# Patient Record
Sex: MALE | Race: WHITE | NOT HISPANIC OR LATINO
[De-identification: names, ages, dates, MRNs, and addresses within clinical notes are randomized per-mention and may not be internally consistent; named-entity substitution may affect disease eponyms.]

---

## 2018-01-29 ENCOUNTER — TRANSCRIPTION ENCOUNTER (OUTPATIENT)
Age: 53
End: 2018-01-29

## 2020-07-27 ENCOUNTER — OUTPATIENT (OUTPATIENT)
Dept: OUTPATIENT SERVICES | Facility: HOSPITAL | Age: 55
LOS: 1 days | End: 2020-07-27
Payer: COMMERCIAL

## 2020-07-27 ENCOUNTER — APPOINTMENT (OUTPATIENT)
Dept: ULTRASOUND IMAGING | Facility: HOSPITAL | Age: 55
End: 2020-07-27
Payer: COMMERCIAL

## 2020-07-27 DIAGNOSIS — Z00.8 ENCOUNTER FOR OTHER GENERAL EXAMINATION: ICD-10-CM

## 2020-07-27 PROCEDURE — 76770 US EXAM ABDO BACK WALL COMP: CPT | Mod: 26

## 2020-07-27 PROCEDURE — 76770 US EXAM ABDO BACK WALL COMP: CPT

## 2020-12-03 ENCOUNTER — APPOINTMENT (OUTPATIENT)
Dept: UROLOGY | Facility: CLINIC | Age: 55
End: 2020-12-03
Payer: COMMERCIAL

## 2020-12-03 VITALS
WEIGHT: 162 LBS | BODY MASS INDEX: 20.79 KG/M2 | DIASTOLIC BLOOD PRESSURE: 71 MMHG | HEART RATE: 84 BPM | RESPIRATION RATE: 17 BRPM | SYSTOLIC BLOOD PRESSURE: 135 MMHG | HEIGHT: 74 IN | TEMPERATURE: 97.8 F

## 2020-12-03 DIAGNOSIS — Z78.9 OTHER SPECIFIED HEALTH STATUS: ICD-10-CM

## 2020-12-03 DIAGNOSIS — Z80.1 FAMILY HISTORY OF MALIGNANT NEOPLASM OF TRACHEA, BRONCHUS AND LUNG: ICD-10-CM

## 2020-12-03 DIAGNOSIS — F17.200 NICOTINE DEPENDENCE, UNSPECIFIED, UNCOMPLICATED: ICD-10-CM

## 2020-12-03 DIAGNOSIS — Z80.8 FAMILY HISTORY OF MALIGNANT NEOPLASM OF OTHER ORGANS OR SYSTEMS: ICD-10-CM

## 2020-12-03 DIAGNOSIS — Z80.3 FAMILY HISTORY OF MALIGNANT NEOPLASM OF BREAST: ICD-10-CM

## 2020-12-03 PROCEDURE — 99205 OFFICE O/P NEW HI 60 MIN: CPT

## 2020-12-03 PROCEDURE — 99072 ADDL SUPL MATRL&STAF TM PHE: CPT

## 2020-12-03 RX ORDER — DIAZEPAM 2 MG/1
2 TABLET ORAL 3 TIMES DAILY
Qty: 90 | Refills: 0 | Status: COMPLETED | COMMUNITY
Start: 2020-12-03 | End: 2021-01-04

## 2020-12-03 NOTE — REVIEW OF SYSTEMS
[Negative] : Heme/Lymph [Eyesight Problems] : eyesight problems [see HPI] : see HPI [Loss of interest] : loss of interest in sexual activity [Genital bacterial infection] : genital bacterial infection [Poor quality erections] : Poor quality erections [Told you have blood in urine on a urine test] : told blood was present in a urine test [Discharge from urine canal] : discharge from urine canal [Strong urge to urinate] : strong urge to urinate [Bladder pressure] : experiences bladder pressure [Strain or push to urinate] : strain or push to urinate [Slow urine stream] : slow urine stream [Bladder fullness after urinating] : bladder fullness after urinating [Leakage of urine with urgency] : leakage of urine with urgency [Unaware of when urine is leaking] : unaware of when urine is leaking

## 2020-12-03 NOTE — REASON FOR VISIT
[Initial Visit ___] : [unfilled] is here today for an initial visit  for [unfilled] [Consideration of Curative Therapy] : consideration of curative therapy for prostate cancer [Spouse] : spouse

## 2020-12-04 PROBLEM — Z80.3 FAMILY HISTORY OF MALIGNANT NEOPLASM OF BREAST: Status: ACTIVE | Noted: 2020-12-03

## 2020-12-04 PROBLEM — Z80.1 FAMILY HISTORY OF LUNG CANCER: Status: ACTIVE | Noted: 2020-12-03

## 2020-12-04 PROBLEM — F17.200 CURRENT SMOKER: Status: ACTIVE | Noted: 2020-12-03

## 2020-12-04 PROBLEM — Z80.8 FAMILY HISTORY OF MALIGNANT NEOPLASM OF BRAIN: Status: ACTIVE | Noted: 2020-12-03

## 2020-12-04 PROBLEM — Z78.9 SOCIAL ALCOHOL USE: Status: ACTIVE | Noted: 2020-12-03

## 2020-12-04 NOTE — DISEASE MANAGEMENT
[1] : T1 [c] : c [0] : N0 [X] : MX [0-10] : 0 -10 ng/mL [6] : [place] Score 6 [3] : 3 [I] : I [] : Patient had no Bone Scan performed [BiopsyDate] : 10/13/2020 [TotalCores] : 12 [TotalPositiveCores] : 1 [MaxCoreInvolvement] : 10% [FreeTextEntry7] : PSA prior to biopsy: 5.44 ng/mL.\par MRI prostate 37 mL volume with intravesical protrusion of median lobe, PIRAD 3 left sided lesion.

## 2020-12-04 NOTE — LETTER CLOSING
[Consult Closing:] : Thank you for allowing me to participate in the care of this patient.  If you have any questions, please do not hesitate to contact me. [Sincerely yours,] : Sincerely yours, [FreeTextEntry3] : Trace Delgadillo MD\par System Chief of Urology, [facility] [place]\par  of Urology\par [facility] [employer] [facility]\par [facility] \par [address] [place] [address], [address] \[place] [address]

## 2020-12-04 NOTE — PHYSICAL EXAM
[FreeTextEntry1] : Constitutional:  Well developed, normal appearance, no acute distress\par Cardiovascular:  Heart rate and rhythm were normal, no peripheral edema\par Pulmonary:  No respiratory distress, normal respiratory rhythm and effort, no accessory muscle use\par Abdomen: Soft, non tender, non distended, no costovertebral angle tenderness\par Genitourinary: Meatus normal, the bladder was normal to palpation, normal scrotum, normal testes without masses.\par Rectal: Normal tone, no nodules\par Musculoskeletal:  Gait and station were normal, no palpable tenderness over the spine or axial skeleton\par Skin:  Normal supple, no rashes\par Psychiatric: Oriented to person, place and time, not anxious\par Neurological:  no focal sensory or motor defects\par Lymphatics: no palpable adenopathy, no inguinal or femoral adenopathy\par

## 2020-12-04 NOTE — HISTORY OF PRESENT ILLNESS
[FreeTextEntry1] : Long history of linear track symptoms.  Has been treated with multiple medications: Tamsulosin, finasteride.  In the last 2 months, developed recurrent episodes of urinary retention.  Currently with Villanueva catheter gravity drainage.  Failed multiple cath removal and void trials.  Underwent MRI prostate which revealed 37 mL prostate with intravesical median lobe protrusion.  PI-RADS 3 lesion noted, underwent MRI/US fusion biopsy.  Pathology revealed single core [place] 6 adenocarcinoma the prostate.  Targeted biopsy was negative.\par \par At this point patient is very frustrated with his persistent urinary retention.  He has been started on mirabegron 25 mg for his bladder spasms.  He continues have significant lower abdominal and pelvic pain.\par \par Denies family history of prostate cancer.  Currently has normal erectile function.\par No other chronic medical problems.

## 2020-12-04 NOTE — HISTORY OF PRESENT ILLNESS
[FreeTextEntry1] : Long history of linear track symptoms.  Has been treated with multiple medications: Tamsulosin, finasteride.  In the last 2 months, developed recurrent episodes of urinary retention.  Currently with Villanueva catheter gravity drainage.  Failed multiple cath removal and void trials.  Underwent MRI prostate which revealed 37 mL prostate with intravesical median lobe protrusion.  PI-RADS 3 lesion noted, underwent MRI/US fusion biopsy.  Pathology revealed single core [place] 6 adenocarcinoma the prostate.  Targeted biopsy was negative.\par \par At this point patient is very frustrated with his persistent urinary retention.  He has been started on mirabegron 25 mg for his bladder spasms.  He continues have significant lower abdominal and pelvic pain.\par \par Denies family history of prostate cancer.  Currently has normal erectile function.\par No other chronic medical problems.

## 2020-12-04 NOTE — DISEASE MANAGEMENT
[1] : T1 [c] : c [0] : N0 [X] : MX [0-10] : 0 -10 ng/mL [6] : [place] Score 6 [3] : 3 [I] : I [] : Patient had no Bone Scan performed [BiopsyDate] : 10/13/2020 [TotalCores] : 12 [TotalPositiveCores] : 1 [MaxCoreInvolvement] : 10% [FreeTextEntry7] : PSA prior to biopsy: 5.44 ng/mL.\par MRI prostate 37 mL volume with intravesical protrusion of median lobe, PIRAD 3 left sided lesion.

## 2020-12-04 NOTE — LETTER CLOSING
[Consult Closing:] : Thank you for allowing me to participate in the care of this patient.  If you have any questions, please do not hesitate to contact me. [Sincerely yours,] : Sincerely yours, [FreeTextEntry3] : Trace Delgadillo MD\par System Chief of Urology, [facility] [place]\par  of Urology\par [facility] [employer] [facility]\par [facility] \par [address] [place] [address], [address] \[place] [address]

## 2020-12-24 ENCOUNTER — OUTPATIENT (OUTPATIENT)
Dept: OUTPATIENT SERVICES | Facility: HOSPITAL | Age: 55
LOS: 1 days | End: 2020-12-24
Payer: COMMERCIAL

## 2020-12-24 ENCOUNTER — RESULT REVIEW (OUTPATIENT)
Age: 55
End: 2020-12-24

## 2020-12-24 VITALS
TEMPERATURE: 97 F | SYSTOLIC BLOOD PRESSURE: 120 MMHG | WEIGHT: 160.06 LBS | RESPIRATION RATE: 14 BRPM | OXYGEN SATURATION: 98 % | HEIGHT: 73 IN | DIASTOLIC BLOOD PRESSURE: 80 MMHG | HEART RATE: 64 BPM

## 2020-12-24 DIAGNOSIS — R00.1 BRADYCARDIA, UNSPECIFIED: ICD-10-CM

## 2020-12-24 DIAGNOSIS — C61 MALIGNANT NEOPLASM OF PROSTATE: ICD-10-CM

## 2020-12-24 DIAGNOSIS — Z98.890 OTHER SPECIFIED POSTPROCEDURAL STATES: Chronic | ICD-10-CM

## 2020-12-24 DIAGNOSIS — K08.9 DISORDER OF TEETH AND SUPPORTING STRUCTURES, UNSPECIFIED: ICD-10-CM

## 2020-12-24 LAB
ALBUMIN SERPL ELPH-MCNC: 3.8 G/DL — SIGNIFICANT CHANGE UP (ref 3.3–5)
ALP SERPL-CCNC: 54 U/L — SIGNIFICANT CHANGE UP (ref 40–120)
ALT FLD-CCNC: 10 U/L — SIGNIFICANT CHANGE UP (ref 4–41)
ANION GAP SERPL CALC-SCNC: 9 MMOL/L — SIGNIFICANT CHANGE UP (ref 7–14)
AST SERPL-CCNC: 16 U/L — SIGNIFICANT CHANGE UP (ref 4–40)
BILIRUB SERPL-MCNC: 0.2 MG/DL — SIGNIFICANT CHANGE UP (ref 0.2–1.2)
BLD GP AB SCN SERPL QL: NEGATIVE — SIGNIFICANT CHANGE UP
BUN SERPL-MCNC: 17 MG/DL — SIGNIFICANT CHANGE UP (ref 7–23)
CALCIUM SERPL-MCNC: 9.4 MG/DL — SIGNIFICANT CHANGE UP (ref 8.4–10.5)
CHLORIDE SERPL-SCNC: 101 MMOL/L — SIGNIFICANT CHANGE UP (ref 98–107)
CO2 SERPL-SCNC: 29 MMOL/L — SIGNIFICANT CHANGE UP (ref 22–31)
CREAT SERPL-MCNC: 0.74 MG/DL — SIGNIFICANT CHANGE UP (ref 0.5–1.3)
GLUCOSE SERPL-MCNC: 68 MG/DL — LOW (ref 70–99)
HCT VFR BLD CALC: 42.6 % — SIGNIFICANT CHANGE UP (ref 39–50)
HGB BLD-MCNC: 13.6 G/DL — SIGNIFICANT CHANGE UP (ref 13–17)
MCHC RBC-ENTMCNC: 30.6 PG — SIGNIFICANT CHANGE UP (ref 27–34)
MCHC RBC-ENTMCNC: 31.9 GM/DL — LOW (ref 32–36)
MCV RBC AUTO: 95.9 FL — SIGNIFICANT CHANGE UP (ref 80–100)
NRBC # BLD: 0 /100 WBCS — SIGNIFICANT CHANGE UP
NRBC # FLD: 0 K/UL — SIGNIFICANT CHANGE UP
PLATELET # BLD AUTO: 273 K/UL — SIGNIFICANT CHANGE UP (ref 150–400)
POTASSIUM SERPL-MCNC: 4.1 MMOL/L — SIGNIFICANT CHANGE UP (ref 3.5–5.3)
POTASSIUM SERPL-SCNC: 4.1 MMOL/L — SIGNIFICANT CHANGE UP (ref 3.5–5.3)
PROT SERPL-MCNC: 7.4 G/DL — SIGNIFICANT CHANGE UP (ref 6–8.3)
RBC # BLD: 4.44 M/UL — SIGNIFICANT CHANGE UP (ref 4.2–5.8)
RBC # FLD: 14.3 % — SIGNIFICANT CHANGE UP (ref 10.3–14.5)
RH IG SCN BLD-IMP: NEGATIVE — SIGNIFICANT CHANGE UP
SODIUM SERPL-SCNC: 139 MMOL/L — SIGNIFICANT CHANGE UP (ref 135–145)
WBC # BLD: 5.46 K/UL — SIGNIFICANT CHANGE UP (ref 3.8–10.5)
WBC # FLD AUTO: 5.46 K/UL — SIGNIFICANT CHANGE UP (ref 3.8–10.5)

## 2020-12-24 PROCEDURE — 93010 ELECTROCARDIOGRAM REPORT: CPT

## 2020-12-24 PROCEDURE — 99072 ADDL SUPL MATRL&STAF TM PHE: CPT

## 2020-12-24 PROCEDURE — 88321 CONSLTJ&REPRT SLD PREP ELSWR: CPT

## 2020-12-24 NOTE — H&P PST ADULT - SKIN
If her routine yearly checkup. Flu shot done. She is looking for shingles vaccine. She is up-to-date on other immunizations. Up-to-date on GI surveillance. Generally doing well.   See problem list. No lesions; no rash

## 2020-12-24 NOTE — H&P PST ADULT - NEGATIVE NEUROLOGICAL SYMPTOMS
no transient paralysis/no weakness/no paresthesias/no generalized seizures/no focal seizures/no headache

## 2020-12-24 NOTE — H&P PST ADULT - NSICDXPROBLEM_GEN_ALL_CORE_FT
PROBLEM DIAGNOSES  Problem: Malignant neoplasm of prostate  Assessment and Plan: Scheduled for robotic assisted laparoscopic radical prostatectomy with Dr. Delgadillo on 01/04/2021.  Verbal and written pre-op instructions provided to patient. Patient verbalized understanding and will call surgeons office for revised instructions if surgery is rescheduled.   Pepcid for GI prophylaxis provided.   Patient aware of need for COVID testing prior to  procedure and advised to coordinate with surgeon.   Patient will obtain medical clearance as per surgeons request-copy requested.

## 2020-12-24 NOTE — H&P PST ADULT - ASSESSMENT
54 yo male with no significant pmh presents to PST unit with pre-op diagnosis of malignant neoplasm of prostate/retention of urine scheduled for robotic assisted laparoscopic radical prostatectomy with Dr. Delgadillo.

## 2020-12-24 NOTE — H&P PST ADULT - HISTORY OF PRESENT ILLNESS
56 yo male with no significant pmh presents to PST unit with pre-op diagnosis of malignant neoplasm of prostate/retention of urine scheduled for robotic assisted laparoscopic radical prostatectomy with Dr. Delgadillo.

## 2020-12-31 NOTE — ASU PATIENT PROFILE, ADULT - SURGERY TIME
07:45 W Plasty Text: The lesion was extirpated to the level of the fat with a #15 scalpel blade.  Given the location of the defect, shape of the defect and the proximity to free margins a W-plasty was deemed most appropriate for repair.  Using a sterile surgical marker, the appropriate transposition arms of the W-plasty were drawn incorporating the defect and placing the expected incisions within the relaxed skin tension lines where possible.    The area thus outlined was incised deep to adipose tissue with a #15 scalpel blade.  The skin margins were undermined to an appropriate distance in all directions utilizing iris scissors.  The opposing transposition arms were then transposed into place in opposite direction and anchored with interrupted buried subcutaneous sutures.

## 2021-01-01 ENCOUNTER — APPOINTMENT (OUTPATIENT)
Dept: DISASTER EMERGENCY | Facility: CLINIC | Age: 56
End: 2021-01-01

## 2021-01-02 LAB — SARS-COV-2 N GENE NPH QL NAA+PROBE: NOT DETECTED

## 2021-01-03 ENCOUNTER — TRANSCRIPTION ENCOUNTER (OUTPATIENT)
Age: 56
End: 2021-01-03

## 2021-01-04 ENCOUNTER — RESULT REVIEW (OUTPATIENT)
Age: 56
End: 2021-01-04

## 2021-01-04 ENCOUNTER — APPOINTMENT (OUTPATIENT)
Dept: UROLOGY | Facility: HOSPITAL | Age: 56
End: 2021-01-04

## 2021-01-04 ENCOUNTER — INPATIENT (INPATIENT)
Facility: HOSPITAL | Age: 56
LOS: 0 days | Discharge: HOME | End: 2021-01-05
Attending: UROLOGY | Admitting: UROLOGY
Payer: COMMERCIAL

## 2021-01-04 VITALS
DIASTOLIC BLOOD PRESSURE: 53 MMHG | SYSTOLIC BLOOD PRESSURE: 111 MMHG | OXYGEN SATURATION: 95 % | HEART RATE: 67 BPM | RESPIRATION RATE: 16 BRPM | HEIGHT: 73 IN | WEIGHT: 160.06 LBS | TEMPERATURE: 98 F

## 2021-01-04 DIAGNOSIS — Z98.890 OTHER SPECIFIED POSTPROCEDURAL STATES: Chronic | ICD-10-CM

## 2021-01-04 DIAGNOSIS — R52 PAIN, UNSPECIFIED: ICD-10-CM

## 2021-01-04 DIAGNOSIS — Z72.0 TOBACCO USE: ICD-10-CM

## 2021-01-04 DIAGNOSIS — C61 MALIGNANT NEOPLASM OF PROSTATE: ICD-10-CM

## 2021-01-04 PROCEDURE — 55866 LAPS SURG PRST8ECT RPBIC RAD: CPT

## 2021-01-04 PROCEDURE — 88305 TISSUE EXAM BY PATHOLOGIST: CPT | Mod: 26

## 2021-01-04 PROCEDURE — 99223 1ST HOSP IP/OBS HIGH 75: CPT

## 2021-01-04 PROCEDURE — 88309 TISSUE EXAM BY PATHOLOGIST: CPT | Mod: 26

## 2021-01-04 RX ORDER — ALPRAZOLAM 0.25 MG
0.25 TABLET ORAL THREE TIMES A DAY
Refills: 0 | Status: DISCONTINUED | OUTPATIENT
Start: 2021-01-04 | End: 2021-01-05

## 2021-01-04 RX ORDER — METOCLOPRAMIDE HCL 10 MG
10 TABLET ORAL ONCE
Refills: 0 | Status: DISCONTINUED | OUTPATIENT
Start: 2021-01-04 | End: 2021-01-04

## 2021-01-04 RX ORDER — NICOTINE POLACRILEX 2 MG
1 GUM BUCCAL DAILY
Refills: 0 | Status: DISCONTINUED | OUTPATIENT
Start: 2021-01-04 | End: 2021-01-05

## 2021-01-04 RX ORDER — FENTANYL CITRATE 50 UG/ML
50 INJECTION INTRAVENOUS
Refills: 0 | Status: DISCONTINUED | OUTPATIENT
Start: 2021-01-04 | End: 2021-01-04

## 2021-01-04 RX ORDER — HYDROMORPHONE HYDROCHLORIDE 2 MG/ML
0.5 INJECTION INTRAMUSCULAR; INTRAVENOUS; SUBCUTANEOUS
Refills: 0 | Status: DISCONTINUED | OUTPATIENT
Start: 2021-01-04 | End: 2021-01-04

## 2021-01-04 RX ORDER — SODIUM CHLORIDE 9 MG/ML
1000 INJECTION, SOLUTION INTRAVENOUS
Refills: 0 | Status: DISCONTINUED | OUTPATIENT
Start: 2021-01-04 | End: 2021-01-04

## 2021-01-04 RX ORDER — ACETAMINOPHEN 500 MG
1000 TABLET ORAL ONCE
Refills: 0 | Status: COMPLETED | OUTPATIENT
Start: 2021-01-04 | End: 2021-01-04

## 2021-01-04 RX ORDER — HEPARIN SODIUM 5000 [USP'U]/ML
5000 INJECTION INTRAVENOUS; SUBCUTANEOUS EVERY 8 HOURS
Refills: 0 | Status: DISCONTINUED | OUTPATIENT
Start: 2021-01-04 | End: 2021-01-05

## 2021-01-04 RX ORDER — DIAZEPAM 5 MG
2 TABLET ORAL THREE TIMES A DAY
Refills: 0 | Status: DISCONTINUED | OUTPATIENT
Start: 2021-01-04 | End: 2021-01-04

## 2021-01-04 RX ORDER — OXYCODONE HYDROCHLORIDE 5 MG/1
5 TABLET ORAL EVERY 4 HOURS
Refills: 0 | Status: DISCONTINUED | OUTPATIENT
Start: 2021-01-04 | End: 2021-01-05

## 2021-01-04 RX ORDER — KETOROLAC TROMETHAMINE 30 MG/ML
15 SYRINGE (ML) INJECTION EVERY 8 HOURS
Refills: 0 | Status: COMPLETED | OUTPATIENT
Start: 2021-01-04 | End: 2021-01-06

## 2021-01-04 RX ORDER — ACETAMINOPHEN 500 MG
650 TABLET ORAL EVERY 6 HOURS
Refills: 0 | Status: DISCONTINUED | OUTPATIENT
Start: 2021-01-04 | End: 2021-01-04

## 2021-01-04 RX ORDER — PIPERACILLIN AND TAZOBACTAM 4; .5 G/20ML; G/20ML
3.38 INJECTION, POWDER, LYOPHILIZED, FOR SOLUTION INTRAVENOUS ONCE
Refills: 0 | Status: COMPLETED | OUTPATIENT
Start: 2021-01-04 | End: 2021-01-04

## 2021-01-04 RX ORDER — SENNA PLUS 8.6 MG/1
2 TABLET ORAL AT BEDTIME
Refills: 0 | Status: DISCONTINUED | OUTPATIENT
Start: 2021-01-04 | End: 2021-01-05

## 2021-01-04 RX ORDER — OXYCODONE HYDROCHLORIDE 5 MG/1
5 TABLET ORAL EVERY 6 HOURS
Refills: 0 | Status: DISCONTINUED | OUTPATIENT
Start: 2021-01-04 | End: 2021-01-04

## 2021-01-04 RX ORDER — PIPERACILLIN AND TAZOBACTAM 4; .5 G/20ML; G/20ML
3.38 INJECTION, POWDER, LYOPHILIZED, FOR SOLUTION INTRAVENOUS EVERY 8 HOURS
Refills: 0 | Status: DISCONTINUED | OUTPATIENT
Start: 2021-01-04 | End: 2021-01-05

## 2021-01-04 RX ORDER — ONDANSETRON 8 MG/1
4 TABLET, FILM COATED ORAL ONCE
Refills: 0 | Status: COMPLETED | OUTPATIENT
Start: 2021-01-04 | End: 2021-01-04

## 2021-01-04 RX ORDER — HYDROMORPHONE HYDROCHLORIDE 2 MG/ML
1 INJECTION INTRAMUSCULAR; INTRAVENOUS; SUBCUTANEOUS EVERY 4 HOURS
Refills: 0 | Status: DISCONTINUED | OUTPATIENT
Start: 2021-01-04 | End: 2021-01-05

## 2021-01-04 RX ORDER — SODIUM CHLORIDE 9 MG/ML
1000 INJECTION, SOLUTION INTRAVENOUS
Refills: 0 | Status: DISCONTINUED | OUTPATIENT
Start: 2021-01-04 | End: 2021-01-05

## 2021-01-04 RX ADMIN — HEPARIN SODIUM 5000 UNIT(S): 5000 INJECTION INTRAVENOUS; SUBCUTANEOUS at 23:14

## 2021-01-04 RX ADMIN — Medication 1 PATCH: at 21:34

## 2021-01-04 RX ADMIN — HYDROMORPHONE HYDROCHLORIDE 1 MILLIGRAM(S): 2 INJECTION INTRAMUSCULAR; INTRAVENOUS; SUBCUTANEOUS at 19:48

## 2021-01-04 RX ADMIN — Medication 650 MILLIGRAM(S): at 14:12

## 2021-01-04 RX ADMIN — FENTANYL CITRATE 50 MICROGRAM(S): 50 INJECTION INTRAVENOUS at 13:25

## 2021-01-04 RX ADMIN — SODIUM CHLORIDE 125 MILLILITER(S): 9 INJECTION, SOLUTION INTRAVENOUS at 21:34

## 2021-01-04 RX ADMIN — OXYCODONE HYDROCHLORIDE 5 MILLIGRAM(S): 5 TABLET ORAL at 17:30

## 2021-01-04 RX ADMIN — Medication 0.25 MILLIGRAM(S): at 21:33

## 2021-01-04 RX ADMIN — ONDANSETRON 4 MILLIGRAM(S): 8 TABLET, FILM COATED ORAL at 14:16

## 2021-01-04 RX ADMIN — Medication 15 MILLIGRAM(S): at 15:06

## 2021-01-04 RX ADMIN — Medication 15 MILLIGRAM(S): at 23:14

## 2021-01-04 RX ADMIN — HYDROMORPHONE HYDROCHLORIDE 1 MILLIGRAM(S): 2 INJECTION INTRAMUSCULAR; INTRAVENOUS; SUBCUTANEOUS at 20:03

## 2021-01-04 RX ADMIN — SODIUM CHLORIDE 125 MILLILITER(S): 9 INJECTION, SOLUTION INTRAVENOUS at 11:39

## 2021-01-04 RX ADMIN — HEPARIN SODIUM 5000 UNIT(S): 5000 INJECTION INTRAVENOUS; SUBCUTANEOUS at 14:30

## 2021-01-04 RX ADMIN — HYDROMORPHONE HYDROCHLORIDE 0.5 MILLIGRAM(S): 2 INJECTION INTRAMUSCULAR; INTRAVENOUS; SUBCUTANEOUS at 13:26

## 2021-01-04 RX ADMIN — HYDROMORPHONE HYDROCHLORIDE 0.5 MILLIGRAM(S): 2 INJECTION INTRAMUSCULAR; INTRAVENOUS; SUBCUTANEOUS at 13:50

## 2021-01-04 RX ADMIN — OXYCODONE HYDROCHLORIDE 5 MILLIGRAM(S): 5 TABLET ORAL at 16:48

## 2021-01-04 RX ADMIN — FENTANYL CITRATE 50 MICROGRAM(S): 50 INJECTION INTRAVENOUS at 13:10

## 2021-01-04 RX ADMIN — HYDROMORPHONE HYDROCHLORIDE 0.5 MILLIGRAM(S): 2 INJECTION INTRAMUSCULAR; INTRAVENOUS; SUBCUTANEOUS at 13:40

## 2021-01-04 RX ADMIN — PIPERACILLIN AND TAZOBACTAM 200 GRAM(S): 4; .5 INJECTION, POWDER, LYOPHILIZED, FOR SOLUTION INTRAVENOUS at 18:37

## 2021-01-04 RX ADMIN — HYDROMORPHONE HYDROCHLORIDE 0.5 MILLIGRAM(S): 2 INJECTION INTRAMUSCULAR; INTRAVENOUS; SUBCUTANEOUS at 14:05

## 2021-01-04 NOTE — CONSULT NOTE ADULT - ASSESSMENT
55M prostate cancer, retention of urine requiring ghotra, been having lower abdominal and pelvic pain. Now 1/4/21 s/p robotic assisted prostatectomy, EBL 300ml.

## 2021-01-04 NOTE — CONSULT NOTE ADULT - SUBJECTIVE AND OBJECTIVE BOX
Patient is a 55y old  Male who presents with a chief complaint of " I'm having a prostatectomy" (24 Dec 2020 08:54)      HPI:  54 yo male with no significant pmh presents to PST unit with pre-op diagnosis of malignant neoplasm of prostate/retention of urine scheduled for robotic assisted laparoscopic radical prostatectomy with Dr. Delgadillo.  (24 Dec 2020 08:54)      History limited due to: [ ] Dementia  [ ] Delirium  [ ] Condition    Pain Symptoms if applicable:             	                         none	   mild         moderate         severe  Pain:	                            0	    1-3	     4-6	         7-10  Location:	  Modifying factors:	  Associated symptoms:	    Function: [ ] Independent  [ ] Assistance  [ ] Total care  [ ] Non-ambulatory    Allergies    No Known Allergies    Intolerances        HOME MEDICATIONS: [ ] Reviewed    MEDICATIONS  (STANDING):  acetaminophen   Tablet .. 650 milliGRAM(s) Oral every 6 hours  heparin   Injectable 5000 Unit(s) SubCutaneous every 8 hours  ketorolac   Injectable 15 milliGRAM(s) IV Push every 8 hours  lactated ringers. 1000 milliLiter(s) (125 mL/Hr) IV Continuous <Continuous>    MEDICATIONS  (PRN):  diazepam    Tablet 2 milliGRAM(s) Oral three times a day PRN anxiety  fentaNYL    Injectable 50 MICROGram(s) IV Push every 5 minutes PRN Severe Pain (7 - 10)  HYDROmorphone  Injectable 0.5 milliGRAM(s) IV Push every 10 minutes PRN Moderate Pain (4 - 6)  metoclopramide Injectable 10 milliGRAM(s) IV Push once PRN Nausea and/or Vomiting  ondansetron Injectable 4 milliGRAM(s) IV Push once PRN Nausea and/or Vomiting  oxyCODONE    IR 5 milliGRAM(s) Oral every 6 hours PRN Severe Pain (7 - 10)  senna 2 Tablet(s) Oral at bedtime PRN Constipation      PAST MEDICAL & SURGICAL HISTORY:  Prostate cancer    History of throat surgery  1990&#x27;s    H/O eye surgery  blind in left eye    [ ] Reviewed     SOCIAL HISTORY:  Residence: [ ] [facility]  [ ] SNF  [ ] Community  [ ] Substance abuse:   [ ] Tobacco:   [ ] Alcohol use:     FAMILY HISTORY:  [ ] No pertinent family history in first degree relatives     REVIEW OF SYSTEMS:    CONSTITUTIONAL: No fever, weight loss, or fatigue  EYES: No eye pain, visual disturbances, or discharge  ENMT:  No difficulty hearing, tinnitus, vertigo; No sinus or throat pain  NECK: No pain or stiffness  BREASTS: No pain, masses, or nipple discharge  RESPIRATORY: No cough, wheezing, chills or hemoptysis; No shortness of breath  CARDIOVASCULAR: No chest pain, palpitations, dizziness, or leg swelling  GASTROINTESTINAL: No abdominal or epigastric pain. No nausea, vomiting, or hematemesis; No diarrhea or constipation. No melena or hematochezia.  GENITOURINARY: No dysuria, frequency, hematuria, or incontinence  NEUROLOGICAL: No headaches, memory loss, loss of strength, numbness, or tremors  SKIN: No itching, burning, rashes, or lesions   LYMPH NODES: No enlarged glands  ENDOCRINE: No heat or cold intolerance; No hair loss  MUSCULOSKELETAL: No muscle or back pain  PSYCHIATRIC: No depression, anxiety, mood swings, or difficulty sleeping  HEME/LYMPH: No easy bruising, or bleeding gums  ALLERGY AND IMMUNOLOGIC: No hives or eczema    [  ] All other ROS negative  [  ] Unable to obtain due to poor mental status    Vital Signs Last 24 Hrs  T(C): 36.5 (04 Jan 2021 13:00), Max: 36.6 (04 Jan 2021 05:59)  T(F): 97.7 (04 Jan 2021 13:00), Max: 97.9 (04 Jan 2021 05:59)  HR: 86 (04 Jan 2021 13:45) (61 - 86)  BP: 113/64 (04 Jan 2021 13:45) (111/53 - 141/71)  BP(mean): 74 (04 Jan 2021 13:45) (74 - 89)  RR: 19 (04 Jan 2021 13:45) (11 - 20)  SpO2: 94% (04 Jan 2021 13:45) (94% - 100%)    PHYSICAL EXAM:  GENERAL: NAD, well-groomed, well-developed  HEAD:  Atraumatic, Normocephalic  EYES: EOMI, PERRLA, conjunctiva and sclera clear  ENMT: Moist mucous membranes  NECK: Supple, No JVD  RESPIRATORY: Clear to auscultation bilaterally; No rales, rhonchi, wheezing, or rubs  CARDIOVASCULAR: Regular rate and rhythm; No murmurs, rubs, or gallops  GASTROINTESTINAL: Soft, Nontender, Nondistended; Bowel sounds present  GENITOURINARY: Not examined  EXTREMITIES:  2+ Peripheral Pulses, No clubbing, cyanosis, or edema  NERVOUS SYSTEM:  Alert & Oriented X3; Moving all 4 extremities; No gross sensory deficits  HEME/LYMPH: No lymphadenopathy noted  SKIN: No rashes or lesions; Incisions C/D/I    LABS:  Comprehensive Metabolic Panel (12.24.20 @ 13:37)    Sodium, Serum: 139 mmol/L    Potassium, Serum: 4.1 mmol/L    Chloride, Serum: 101 mmol/L    Carbon Dioxide, Serum: 29 mmol/L    Anion Gap, Serum: 9 mmol/L    Blood Urea Nitrogen, Serum: 17 mg/dL    Creatinine, Serum: 0.74 mg/dL    Glucose, Serum: 68 mg/dL    Calcium, Total Serum: 9.4 mg/dL    Protein Total, Serum: 7.4 g/dL    Albumin, Serum: 3.8 g/dL    Bilirubin Total, Serum: 0.2 mg/dL    Alkaline Phosphatase, Serum: 54 U/L    Aspartate Aminotransferase (AST/SGOT): 16 U/L    Alanine Aminotransferase (ALT/SGPT): 10 U/L    eGFR if Non : 104:     Complete Blood Count (12.24.20 @ 13:37)    Nucleated RBC: 0 /100 WBCs    WBC Count: 5.46 K/uL    RBC Count: 4.44 M/uL    Hemoglobin: 13.6 g/dL    Hematocrit: 42.6 %    Mean Cell Volume: 95.9 fL    Mean Cell Hemoglobin: 30.6 pg    Mean Cell Hemoglobin Conc: 31.9 gm/dL    Red Cell Distrib Width: 14.3 %    Platelet Count - Automated: 273 K/uL    Nucleated RBC #: 0.00 K/uL    RADIOLOGY & ADDITIONAL STUDIES:    EKG:   Personally Reviewed:  [ ] YES     Imaging:   Personally Reviewed:  [ ] YES               Consultant(s) notes reviewed:    Care Discussed with Consultant(s)/Other Providers: urology re overall care PMD: Dr. Kiersten Faulkner [phone]    Patient is a 55y old  Male who presents with a chief complaint of " I'm having a prostatectomy" (24 Dec 2020 08:54)    HPI: 55M prostate cancer, retention of urine scheduled for robotic assisted laparoscopic radical prostatectomy    Allergies:  No Known Allergies    HOME MEDICATIONS:   · 	diazePAM 2 mg oral tablet: 1 tab(s) orally 3 times a day  · 	Percocet 5/325 oral tablet: 1 tab(s) orally 2 times a day, As Needed  · 	ibuprofen: 1  orally , As Needed  · 	finasteride 5 mg oral tablet: 1 tab(s) orally once a day, AM  · 	tamsulosin 0.4 mg oral capsule: 1 cap(s) orally 2 times a day  · 	Myrbetriq 50 mg oral tablet, extended release: 1 tab(s) orally once a day  · 	amoxicillin 500 mg oral capsule: 1 cap(s) orally 3 times a day, LAST DOSE 12/27/20  · 	STOOL SOFTENER 2 TABS PO DAILY:     MEDICATIONS  (STANDING):  acetaminophen   Tablet .. 650 milliGRAM(s) Oral every 6 hours  heparin   Injectable 5000 Unit(s) SubCutaneous every 8 hours  ketorolac   Injectable 15 milliGRAM(s) IV Push every 8 hours  lactated ringers. 1000 milliLiter(s) (125 mL/Hr) IV Continuous <Continuous>    MEDICATIONS  (PRN):  diazepam    Tablet 2 milliGRAM(s) Oral three times a day PRN anxiety  fentaNYL    Injectable 50 MICROGram(s) IV Push every 5 minutes PRN Severe Pain (7 - 10)  HYDROmorphone  Injectable 0.5 milliGRAM(s) IV Push every 10 minutes PRN Moderate Pain (4 - 6)  metoclopramide Injectable 10 milliGRAM(s) IV Push once PRN Nausea and/or Vomiting  ondansetron Injectable 4 milliGRAM(s) IV Push once PRN Nausea and/or Vomiting  oxyCODONE    IR 5 milliGRAM(s) Oral every 6 hours PRN Severe Pain (7 - 10)  senna 2 Tablet(s) Oral at bedtime PRN Constipation    PAST MEDICAL & SURGICAL HISTORY:  Prostate cancer  History of throat surgery 1990&#x27;s  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     SOCIAL HISTORY:  , 5 children  1ppd, +marijuana, 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 No melena or hematochezia.  GENITOURINARY: per HPI  NEUROLOGICAL: No headaches, memory loss, loss of strength, numbness, or tremors  SKIN: No itching, burning, rashes, or lesions   LYMPH NODES: No enlarged glands  ENDOCRINE: No heat or cold intolerance; No hair loss  MUSCULOSKELETAL: No muscle or back pain  PSYCHIATRIC: No depression, anxiety, mood swings, or difficulty sleeping  HEME/LYMPH: No easy bruising, or bleeding gums  ALLERGY AND IMMUNOLOGIC: No hives or eczema  [  ] All other ROS negative  [  ] Unable to obtain due to poor mental status    Vital Signs Last 24 Hrs  T(C): 36.5 (04 Jan 2021 13:00), Max: 36.6 (04 Jan 2021 05:59)  T(F): 97.7 (04 Jan 2021 13:00), Max: 97.9 (04 Jan 2021 05:59)  HR: 86 (04 Jan 2021 13:45) (61 - 86)  BP: 113/64 (04 Jan 2021 13:45) (111/53 - 141/71)  BP(mean): 74 (04 Jan 2021 13:45) (74 - 89)  RR: 19 (04 Jan 2021 13:45) (11 - 20)  SpO2: 94% (04 Jan 2021 13:45) (94% - 100%)    PHYSICAL EXAM:  GENERAL: lying on stretcher in PACU, moderate discomfort d/t pain  HEAD:  Atraumatic, Normocephalic  EYES: EOMI, PERRLA, conjunctiva and sclera clear  ENMT: dry mucous membranes  NECK: Supple, No JVD  RESPIRATORY: Clear to auscultation bilaterally; No rales, rhonchi, wheezing, or rubs  CARDIOVASCULAR: Regular rate and rhythm; No murmurs, rubs, or gallops  GASTROINTESTINAL: Soft, incisions intact  GENITOURINARY: +ghotra   EXTREMITIES:  2+ Peripheral Pulses, No clubbing, cyanosis, or edema  NERVOUS SYSTEM: nonfocal  HEME/LYMPH: No lymphadenopathy noted  SKIN: No rashes or lesions  PSYCH: mildly anxious    LABS:  Comprehensive Metabolic Panel (12.24.20 @ 13:37)    Sodium, Serum: 139 mmol/L    Potassium, Serum: 4.1 mmol/L    Chloride, Serum: 101 mmol/L    Carbon Dioxide, Serum: 29 mmol/L    Anion Gap, Serum: 9 mmol/L    Blood Urea Nitrogen, Serum: 17 mg/dL    Creatinine, Serum: 0.74 mg/dL    Glucose, Serum: 68 mg/dL    Calcium, Total Serum: 9.4 mg/dL    Protein Total, Serum: 7.4 g/dL    Albumin, Serum: 3.8 g/dL    Bilirubin Total, Serum: 0.2 mg/dL    Alkaline Phosphatase, Serum: 54 U/L    Aspartate Aminotransferase (AST/SGOT): 16 U/L    Alanine Aminotransferase (ALT/SGPT): 10 U/L    eGFR if Non : 104:     Complete Blood Count (12.24.20 @ 13:37)    Nucleated RBC: 0 /100 WBCs    WBC Count: 5.46 K/uL    RBC Count: 4.44 M/uL    Hemoglobin: 13.6 g/dL    Hematocrit: 42.6 %    Mean Cell Volume: 95.9 fL    Mean Cell Hemoglobin: 30.6 pg    Mean Cell Hemoglobin Conc: 31.9 gm/dL    Red Cell Distrib Width: 14.3 %    Platelet Count - Automated: 273 K/uL    Nucleated RBC #: 0.00 K/uL    RADIOLOGY & ADDITIONAL STUDIES:    EKG:   Personally Reviewed:  [ ] YES     Imaging:   Personally Reviewed:  [ ] YES               Consultant(s) notes reviewed:    Care Discussed with Consultant(s)/Other Providers: urology re overall care PMD: Dr. Kiersten Faulkner [phone]    Patient is a 55y old  Male who presents with a chief complaint of " I'm having a prostatectomy" (24 Dec 2020 08:54)    HPI: 55M prostate cancer, retention of urine requiring ghotra, been having lower abdominal and pelvic pain.  Was prescribed diazepem for spasms but not helping.    Now 1/4/21 s/p robotic assisted prostatectomy, EBL 300ml. C/o significant pain at surgical site, getting IV pain meds.      Allergies:  No Known Allergies    HOME MEDICATIONS:   · 	diazePAM 2 mg oral tablet: 1 tab(s) orally 3 times a day  · 	Percocet 5/325 oral tablet: 1 tab(s) orally 2 times a day, As Needed  · 	ibuprofen: 1  orally , As Needed  · 	finasteride 5 mg oral tablet: 1 tab(s) orally once a day, AM  · 	tamsulosin 0.4 mg oral capsule: 1 cap(s) orally 2 times a day  · 	Myrbetriq 50 mg oral tablet, extended release: 1 tab(s) orally once a day  · 	amoxicillin 500 mg oral capsule: 1 cap(s) orally 3 times a day, LAST DOSE 12/27/20  · 	STOOL SOFTENER 2 TABS PO DAILY:     MEDICATIONS  (STANDING):  acetaminophen   Tablet .. 650 milliGRAM(s) Oral every 6 hours  heparin   Injectable 5000 Unit(s) SubCutaneous every 8 hours  ketorolac   Injectable 15 milliGRAM(s) IV Push every 8 hours  lactated ringers. 1000 milliLiter(s) (125 mL/Hr) IV Continuous <Continuous>    MEDICATIONS  (PRN):  diazepam    Tablet 2 milliGRAM(s) Oral three times a day PRN anxiety  fentaNYL    Injectable 50 MICROGram(s) IV Push every 5 minutes PRN Severe Pain (7 - 10)  HYDROmorphone  Injectable 0.5 milliGRAM(s) IV Push every 10 minutes PRN Moderate Pain (4 - 6)  metoclopramide Injectable 10 milliGRAM(s) IV Push once PRN Nausea and/or Vomiting  ondansetron Injectable 4 milliGRAM(s) IV Push once PRN Nausea and/or Vomiting  oxyCODONE    IR 5 milliGRAM(s) Oral every 6 hours PRN Severe Pain (7 - 10)  senna 2 Tablet(s) Oral at bedtime PRN Constipation    PAST MEDICAL & SURGICAL HISTORY:  Prostate cancer  History of throat surgery 1990&#x27;s  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     SOCIAL HISTORY:  , 5 children  1ppd, +marijuana, 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 No melena or hematochezia.  GENITOURINARY: per HPI  NEUROLOGICAL: No headaches, memory loss, loss of strength, numbness, or tremors  SKIN: No itching, burning, rashes, or lesions   LYMPH NODES: No enlarged glands  ENDOCRINE: No heat or cold intolerance; No hair loss  MUSCULOSKELETAL: No muscle or back pain  PSYCHIATRIC: No depression, anxiety, mood swings, or difficulty sleeping  HEME/LYMPH: No easy bruising, or bleeding gums  ALLERGY AND IMMUNOLOGIC: No hives or eczema  [  ] All other ROS negative  [  ] Unable to obtain due to poor mental status    Vital Signs Last 24 Hrs  T(C): 36.5 (04 Jan 2021 13:00), Max: 36.6 (04 Jan 2021 05:59)  T(F): 97.7 (04 Jan 2021 13:00), Max: 97.9 (04 Jan 2021 05:59)  HR: 86 (04 Jan 2021 13:45) (61 - 86)  BP: 113/64 (04 Jan 2021 13:45) (111/53 - 141/71)  BP(mean): 74 (04 Jan 2021 13:45) (74 - 89)  RR: 19 (04 Jan 2021 13:45) (11 - 20)  SpO2: 94% (04 Jan 2021 13:45) (94% - 100%)    PHYSICAL EXAM:  GENERAL: lying on stretcher in PACU, moderate discomfort d/t pain  HEAD:  Atraumatic, Normocephalic  EYES: EOMI, PERRLA, conjunctiva and sclera clear  ENMT: dry mucous membranes  NECK: Supple, No JVD  RESPIRATORY: Clear to auscultation bilaterally; No rales, rhonchi, wheezing, or rubs  CARDIOVASCULAR: Regular rate and rhythm; No murmurs, rubs, or gallops  GASTROINTESTINAL: Soft, incisions intact  GENITOURINARY: +ghotra   EXTREMITIES:  2+ Peripheral Pulses, No clubbing, cyanosis, or edema  NERVOUS SYSTEM: nonfocal  HEME/LYMPH: No lymphadenopathy noted  SKIN: No rashes or lesions  PSYCH: mildly anxious    LABS:  Comprehensive Metabolic Panel (12.24.20 @ 13:37)    Sodium, Serum: 139 mmol/L    Potassium, Serum: 4.1 mmol/L    Chloride, Serum: 101 mmol/L    Carbon Dioxide, Serum: 29 mmol/L    Anion Gap, Serum: 9 mmol/L    Blood Urea Nitrogen, Serum: 17 mg/dL    Creatinine, Serum: 0.74 mg/dL    Glucose, Serum: 68 mg/dL    Calcium, Total Serum: 9.4 mg/dL    Protein Total, Serum: 7.4 g/dL    Albumin, Serum: 3.8 g/dL    Bilirubin Total, Serum: 0.2 mg/dL    Alkaline Phosphatase, Serum: 54 U/L    Aspartate Aminotransferase (AST/SGOT): 16 U/L    Alanine Aminotransferase (ALT/SGPT): 10 U/L    eGFR if Non : 104:     Complete Blood Count (12.24.20 @ 13:37)    Nucleated RBC: 0 /100 WBCs    WBC Count: 5.46 K/uL    RBC Count: 4.44 M/uL    Hemoglobin: 13.6 g/dL    Hematocrit: 42.6 %    Mean Cell Volume: 95.9 fL    Mean Cell Hemoglobin: 30.6 pg    Mean Cell Hemoglobin Conc: 31.9 gm/dL    Red Cell Distrib Width: 14.3 %    Platelet Count - Automated: 273 K/uL    Nucleated RBC #: 0.00 K/uL    RADIOLOGY & ADDITIONAL STUDIES:    EKG:   Personally Reviewed:  [ ] YES     Imaging:   Personally Reviewed:  [ ] YES               Consultant(s) notes reviewed:    Care Discussed with Consultant(s)/Other Providers: urology re overall care

## 2021-01-04 NOTE — PROGRESS NOTE ADULT - SUBJECTIVE AND OBJECTIVE BOX
Post op check    This 56 yo M is s/p RALP  PMH: none  Pt is awake and alert  Has c/o pain  Afeb 136/72 93 97%RA    Abd- soft; appropriately tender             wounds C&D  Villanueva 110+

## 2021-01-04 NOTE — ASU PREOP CHECKLIST - COMMENTS
Sip of water with "pills " at 4 am Sip of water with "pills " at 4 am including percocet and diazapam

## 2021-01-05 ENCOUNTER — TRANSCRIPTION ENCOUNTER (OUTPATIENT)
Age: 56
End: 2021-01-05

## 2021-01-05 VITALS
OXYGEN SATURATION: 98 % | SYSTOLIC BLOOD PRESSURE: 133 MMHG | DIASTOLIC BLOOD PRESSURE: 70 MMHG | HEART RATE: 83 BPM | TEMPERATURE: 98 F | RESPIRATION RATE: 18 BRPM

## 2021-01-05 PROCEDURE — 99232 SBSQ HOSP IP/OBS MODERATE 35: CPT

## 2021-01-05 RX ORDER — AMOXICILLIN 250 MG/5ML
1 SUSPENSION, RECONSTITUTED, ORAL (ML) ORAL
Qty: 0 | Refills: 0 | DISCHARGE

## 2021-01-05 RX ORDER — SODIUM CHLORIDE 9 MG/ML
1000 INJECTION, SOLUTION INTRAVENOUS
Refills: 0 | Status: DISCONTINUED | OUTPATIENT
Start: 2021-01-05 | End: 2021-01-05

## 2021-01-05 RX ORDER — DIAZEPAM 5 MG
1 TABLET ORAL
Qty: 0 | Refills: 0 | DISCHARGE

## 2021-01-05 RX ORDER — ACETAMINOPHEN 500 MG
650 TABLET ORAL ONCE
Refills: 0 | Status: COMPLETED | OUTPATIENT
Start: 2021-01-05 | End: 2021-01-05

## 2021-01-05 RX ORDER — IBUPROFEN 200 MG
1 TABLET ORAL
Qty: 20 | Refills: 0
Start: 2021-01-05 | End: 2021-01-09

## 2021-01-05 RX ORDER — OXYCODONE HYDROCHLORIDE 5 MG/1
2 TABLET ORAL
Qty: 32 | Refills: 0
Start: 2021-01-05 | End: 2021-01-08

## 2021-01-05 RX ORDER — IBUPROFEN 200 MG
1 TABLET ORAL
Qty: 0 | Refills: 0 | DISCHARGE

## 2021-01-05 RX ORDER — TAMSULOSIN HYDROCHLORIDE 0.4 MG/1
1 CAPSULE ORAL
Qty: 0 | Refills: 0 | DISCHARGE

## 2021-01-05 RX ORDER — FINASTERIDE 5 MG/1
1 TABLET, FILM COATED ORAL
Qty: 0 | Refills: 0 | DISCHARGE

## 2021-01-05 RX ADMIN — PIPERACILLIN AND TAZOBACTAM 25 GRAM(S): 4; .5 INJECTION, POWDER, LYOPHILIZED, FOR SOLUTION INTRAVENOUS at 01:36

## 2021-01-05 RX ADMIN — PIPERACILLIN AND TAZOBACTAM 25 GRAM(S): 4; .5 INJECTION, POWDER, LYOPHILIZED, FOR SOLUTION INTRAVENOUS at 10:09

## 2021-01-05 RX ADMIN — Medication 1 PATCH: at 06:26

## 2021-01-05 RX ADMIN — Medication 15 MILLIGRAM(S): at 07:06

## 2021-01-05 RX ADMIN — HEPARIN SODIUM 5000 UNIT(S): 5000 INJECTION INTRAVENOUS; SUBCUTANEOUS at 07:06

## 2021-01-05 RX ADMIN — HYDROMORPHONE HYDROCHLORIDE 1 MILLIGRAM(S): 2 INJECTION INTRAMUSCULAR; INTRAVENOUS; SUBCUTANEOUS at 00:35

## 2021-01-05 RX ADMIN — Medication 650 MILLIGRAM(S): at 13:48

## 2021-01-05 RX ADMIN — HYDROMORPHONE HYDROCHLORIDE 1 MILLIGRAM(S): 2 INJECTION INTRAMUSCULAR; INTRAVENOUS; SUBCUTANEOUS at 04:43

## 2021-01-05 RX ADMIN — Medication 650 MILLIGRAM(S): at 13:18

## 2021-01-05 RX ADMIN — HYDROMORPHONE HYDROCHLORIDE 1 MILLIGRAM(S): 2 INJECTION INTRAMUSCULAR; INTRAVENOUS; SUBCUTANEOUS at 01:00

## 2021-01-05 RX ADMIN — HYDROMORPHONE HYDROCHLORIDE 1 MILLIGRAM(S): 2 INJECTION INTRAMUSCULAR; INTRAVENOUS; SUBCUTANEOUS at 05:00

## 2021-01-05 NOTE — DISCHARGE NOTE PROVIDER - NSDCCPCAREPLAN_GEN_ALL_CORE_FT
PRINCIPAL DISCHARGE DIAGNOSIS  Diagnosis: Malignant neoplasm of prostate  Assessment and Plan of Treatment: Drink plenty of fluids.  No heavy lifting (greater than 10 pounds) or straining for 4 to 6 weeks.  You may shower, just pat white strips dry, they will fall off in a few weeks.  Do not drive when taking pain medication.  Call 's  office  to schedule a follow up appointment to remove catheter next week.  Call the office if you have fever greater than 101, pain not relieved with pain medication, nausea/vomiting..

## 2021-01-05 NOTE — PROGRESS NOTE ADULT - ASSESSMENT
stable s/p RALP  POD#1 - no events  Plan:  - OOB  - pain control  - clears until flatus
stable s/p RALP  Plan:  - OOB  - pain control  - clears until flatus
55M prostate cancer, retention of urine requiring ghotra, been having lower abdominal and pelvic pain. 1/4/21 s/p robotic assisted prostatectomy

## 2021-01-05 NOTE — DISCHARGE NOTE NURSING/CASE MANAGEMENT/SOCIAL WORK - NSDCPECAREGIVERED_GEN_ALL_CORE
carenotes on prostatectomy, ghotra care, managing pain at home handout, side effects pamphlet, carenotes on d/c medications

## 2021-01-05 NOTE — PROGRESS NOTE ADULT - SUBJECTIVE AND OBJECTIVE BOX
[facility] Division of Hospital Medicine  Renee Parker MD  Pager (M-F, 8A-5P):  In-house pager [phone]; Long-range pager [phone]  Other Times:  Please page Hospitalist in Charge -  In-house pager [phone]    Patient is a 55y old  Male who presents with a chief complaint of surgery (04 Jan 2021 13:52)      SUBJECTIVE / OVERNIGHT EVENTS: No problems reported over night. Pt seen earlier, sitting up in chair, doing ok.  Pain at lateral port side.  No chest pain, SOB, nausea.  Walked in gandara, passed small amount flatus.  ADDITIONAL REVIEW OF SYSTEMS:    MEDICATIONS  (STANDING):  dextrose 5% + sodium chloride 0.45%. 1000 milliLiter(s) (75 mL/Hr) IV Continuous <Continuous>  heparin   Injectable 5000 Unit(s) SubCutaneous every 8 hours  ketorolac   Injectable 15 milliGRAM(s) IV Push every 8 hours  nicotine - 21 mG/24Hr(s) Patch 1 patch Transdermal daily  piperacillin/tazobactam IVPB.. 3.375 Gram(s) IV Intermittent every 8 hours    MEDICATIONS  (PRN):  ALPRAZolam 0.25 milliGRAM(s) Oral three times a day PRN anxiety  HYDROmorphone  Injectable 1 milliGRAM(s) IV Push every 4 hours PRN Severe Pain (7 - 10)  oxyCODONE    IR 5 milliGRAM(s) Oral every 4 hours PRN Moderate Pain (4 - 6), Mild pain (1-3)  senna 2 Tablet(s) Oral at bedtime PRN Constipation      CAPILLARY BLOOD GLUCOSE        I&O's Summary    04 Jan 2021 07:01  -  05 Jan 2021 07:00  --------------------------------------------------------  IN: 505 mL / OUT: 1160 mL / NET: -655 mL    05 Jan 2021 07:01  -  05 Jan 2021 11:50  --------------------------------------------------------  IN: 0 mL / OUT: 200 mL / NET: -200 mL        PHYSICAL EXAM:  Vital Signs Last 24 Hrs  T(C): 36.9 (05 Jan 2021 09:58), Max: 37.4 (05 Jan 2021 00:33)  T(F): 98.4 (05 Jan 2021 09:58), Max: 99.3 (05 Jan 2021 00:33)  HR: 83 (05 Jan 2021 09:58) (61 - 93)  BP: 133/70 (05 Jan 2021 09:58) (99/53 - 146/72)  BP(mean): 65 (04 Jan 2021 14:15) (65 - 83)  RR: 18 (05 Jan 2021 09:58) (11 - 20)  SpO2: 98% (05 Jan 2021 09:58) (94% - 100%)    GENERAL: sitting up in chair, NAD  RESPIRATORY: Clear to auscultation bilaterally; No rales, rhonchi, wheezing, or rubs  CARDIOVASCULAR: Regular rate and rhythm; No murmurs, rubs, or gallops  GASTROINTESTINAL: Soft, incisions intact  GENITOURINARY: +ghotra   EXTREMITIES:  2+ Peripheral Pulses, No clubbing, cyanosis, or edema  PSYCH: calm    LABS:                      RADIOLOGY & ADDITIONAL TESTS:  Results Reviewed:   Imaging Personally Reviewed:  Electrocardiogram Personally Reviewed:    COORDINATION OF CARE:  Care Discussed with Consultants/Other Providers [Y/N]: urology re overall care  Prior or Outpatient Records Reviewed [Y/N]:   [facility] Division of Hospital Medicine  Renee Parker MD  Pager (M-F, 8A-5P):  In-house pager [phone]; Long-range pager [phone]  Other Times:  Please page Hospitalist in Charge -  In-house pager [phone]    Patient is a 55y old  Male who presents with a chief complaint of surgery (04 Jan 2021 13:52)      SUBJECTIVE / OVERNIGHT EVENTS: No problems reported over night. Pt seen earlier, sitting up in chair, doing ok.  Pain at lateral port side.  No chest pain, SOB, nausea.  Walked in gandara, passed small amount flatus.  ADDITIONAL REVIEW OF SYSTEMS:    MEDICATIONS  (STANDING):  dextrose 5% + sodium chloride 0.45%. 1000 milliLiter(s) (75 mL/Hr) IV Continuous <Continuous>  heparin   Injectable 5000 Unit(s) SubCutaneous every 8 hours  ketorolac   Injectable 15 milliGRAM(s) IV Push every 8 hours  nicotine - 21 mG/24Hr(s) Patch 1 patch Transdermal daily  piperacillin/tazobactam IVPB.. 3.375 Gram(s) IV Intermittent every 8 hours    MEDICATIONS  (PRN):  ALPRAZolam 0.25 milliGRAM(s) Oral three times a day PRN anxiety  HYDROmorphone  Injectable 1 milliGRAM(s) IV Push every 4 hours PRN Severe Pain (7 - 10)  oxyCODONE    IR 5 milliGRAM(s) Oral every 4 hours PRN Moderate Pain (4 - 6), Mild pain (1-3)  senna 2 Tablet(s) Oral at bedtime PRN Constipation      CAPILLARY BLOOD GLUCOSE        I&O's Summary    04 Jan 2021 07:01  -  05 Jan 2021 07:00  --------------------------------------------------------  IN: 505 mL / OUT: 1160 mL / NET: -655 mL    05 Jan 2021 07:01  -  05 Jan 2021 11:50  --------------------------------------------------------  IN: 0 mL / OUT: 200 mL / NET: -200 mL        PHYSICAL EXAM:  Vital Signs Last 24 Hrs  T(C): 36.9 (05 Jan 2021 09:58), Max: 37.4 (05 Jan 2021 00:33)  T(F): 98.4 (05 Jan 2021 09:58), Max: 99.3 (05 Jan 2021 00:33)  HR: 83 (05 Jan 2021 09:58) (61 - 93)  BP: 133/70 (05 Jan 2021 09:58) (99/53 - 146/72)  BP(mean): 65 (04 Jan 2021 14:15) (65 - 83)  RR: 18 (05 Jan 2021 09:58) (11 - 20)  SpO2: 98% (05 Jan 2021 09:58) (94% - 100%)    GENERAL: sitting up in chair, NAD --> walking in gandara  RESPIRATORY: Clear to auscultation bilaterally; No rales, rhonchi, wheezing, or rubs  CARDIOVASCULAR: Regular rate and rhythm; No murmurs, rubs, or gallops  GASTROINTESTINAL: Soft, incisions intact  GENITOURINARY: +ghotra   EXTREMITIES:  2+ Peripheral Pulses, No clubbing, cyanosis, or edema  PSYCH: calm    LABS:                      RADIOLOGY & ADDITIONAL TESTS:  Results Reviewed:   Imaging Personally Reviewed:  Electrocardiogram Personally Reviewed:    COORDINATION OF CARE:  Care Discussed with Consultants/Other Providers [Y/N]: urology re overall care  Prior or Outpatient Records Reviewed [Y/N]:   no peripheral edema/no chest pain/no syncope

## 2021-01-05 NOTE — DISCHARGE NOTE NURSING/CASE MANAGEMENT/SOCIAL WORK - NSDCPNINST_GEN_ALL_CORE
Notify dr Delgadillo if you experience any increase in pain not relieved with pain medication, any persistent  nausea vomiting, any bright red blood or clots in urine or any fever >100.5.  drink plenty of fluids.  No heavy lifting or straining.  Villanueva care as ordered, use large drainage bag at night and leg bag during the day.  Use over the counter stool softeners to assist with constipation which can be a side effect of narcotic pain medication. Notify Dr Delgadillo if you experience any increase in pain not relieved with pain medication, any persistent  nausea vomiting, any bright red blood or clots in urine or any fever >100.5.  drink plenty of fluids.  No heavy lifting or straining.  Villanueva care as ordered, use large drainage bag at night and leg bag during the day.  Use over the counter stool softeners to assist with constipation which can be a side effect of narcotic pain medication.

## 2021-01-05 NOTE — DISCHARGE NOTE PROVIDER - HOSPITAL COURSE
Pt had RALP yesterday; did well; ambulating; passed some gas and chiquis reg diet; pain is controlled with oxycodone; home today with ghotra; there was no HARSHAL Pt had RALP yesterday; did well; ambulating; passed some gas and chiquis reg diet; pain is controlled with oxycodone; home today with ghotra; there was no HARSHAL.

## 2021-01-05 NOTE — DISCHARGE NOTE PROVIDER - NSDCMRMEDTOKEN_GEN_ALL_CORE_FT
ibuprofen 400 mg oral tablet: 1-2 tab(s) orally every 6 hours, As Needed MDD:8  Myrbetriq 50 mg oral tablet, extended release: 1 tab(s) orally once a day  oxyCODONE 5 mg oral tablet: 1-2 tab(s) orally every 6 hours, As Needed - MDD:8   Augmentin 875 mg-125 mg oral tablet: 1 tab(s) orally every 12 hours   ibuprofen 400 mg oral tablet: 1-2 tab(s) orally every 6 hours, As Needed MDD:8  Myrbetriq 50 mg oral tablet, extended release: 1 tab(s) orally once a day  oxyCODONE 5 mg oral tablet: 1-2 tab(s) orally every 6 hours, As Needed - MDD:8

## 2021-01-05 NOTE — PROGRESS NOTE ADULT - PROBLEM SELECTOR PLAN 2
post op management per urology, early ambulation, await return of bowel function, pain control, IVFs, IS, DVT ppx (SC heparin). post op management per urology, encourage ambulation, bowel function returning, pain control, IVFs, IS, DVT ppx (SC heparin).  likely d/c home today

## 2021-01-05 NOTE — DISCHARGE NOTE NURSING/CASE MANAGEMENT/SOCIAL WORK - NSDPDISTO_GEN_ALL_CORE
Home stable, tolerating diet, ghotra to leg bag drainage with qs urine, abdominal lap sites in place, clean dry and intact,/Home

## 2021-01-05 NOTE — PROGRESS NOTE ADULT - SUBJECTIVE AND OBJECTIVE BOX
POD #1  Afeb 116/50 64 99%RA    Pt has c/o pain   Abd- soft; tender L. side incision; ND; no flatus     wounds C&D  Villanueva 750

## 2021-01-05 NOTE — DISCHARGE NOTE NURSING/CASE MANAGEMENT/SOCIAL WORK - PATIENT PORTAL LINK FT
You can access the FollowMyHealth Patient Portal offered by [facility] by registering at the following website: [email or web address]. By joining [facility]’s FollowMyHealth portal, you will also be able to view your health information using other applications (apps) compatible with our system.

## 2021-01-05 NOTE — PROGRESS NOTE ADULT - PROBLEM SELECTOR PLAN 1
on ATC Toradol and Tylenol; PRN oxy, titrate as needed  pt reports diazepam 2mg PTA was not helpful. on ATC Toradol and Tylenol; PRN oxy, titrate as needed

## 2021-01-05 NOTE — DISCHARGE NOTE PROVIDER - CARE PROVIDER_API CALL
Trace Delgadillo  UROLOGY  [address] [place] [address], [place] [id number]  [place] [id number]  Phone: [phone]  Fax: [phone]  Follow Up Time:

## 2021-01-06 ENCOUNTER — NON-APPOINTMENT (OUTPATIENT)
Age: 56
End: 2021-01-06

## 2021-01-07 PROBLEM — C61 MALIGNANT NEOPLASM OF PROSTATE: Chronic | Status: ACTIVE | Noted: 2020-12-24

## 2021-01-11 ENCOUNTER — APPOINTMENT (OUTPATIENT)
Dept: UROLOGY | Facility: CLINIC | Age: 56
End: 2021-01-11
Payer: COMMERCIAL

## 2021-01-11 PROCEDURE — 99024 POSTOP FOLLOW-UP VISIT: CPT

## 2021-01-11 NOTE — ASSESSMENT
[FreeTextEntry1] : Catheter removed w/o difficulty.  Voided clear urine (spoke with patient following appointment).\par Kegel exercises reviewed.\par Will call with path results.\par Encouraged to increase H2O intake.\par F/U in six weeks to repeat PSA

## 2021-01-11 NOTE — HISTORY OF PRESENT ILLNESS
[FreeTextEntry1] : S/p Robotic RP, January 4th 2021\par Pathology: pending\par \par Had complaints of lower extremity swelling on 1/6/2021. Bilateral Lower extremity Doppler negative for DVT. Incisions to abdomen healing\par Tolerating PO, denies any nausea, denies vomiting\par Denies any fevers or chills. \par Urine is clear.

## 2021-01-12 RX ORDER — OXYCODONE AND ACETAMINOPHEN 5; 325 MG/1; MG/1
5-325 TABLET ORAL
Refills: 0 | Status: COMPLETED | COMMUNITY
End: 2021-01-12

## 2021-01-14 ENCOUNTER — INPATIENT (INPATIENT)
Facility: HOSPITAL | Age: 56
LOS: 5 days | Discharge: HOME HEALTH | End: 2021-01-20
Attending: UROLOGY | Admitting: UROLOGY
Payer: COMMERCIAL

## 2021-01-14 VITALS
DIASTOLIC BLOOD PRESSURE: 79 MMHG | SYSTOLIC BLOOD PRESSURE: 160 MMHG | TEMPERATURE: 99 F | RESPIRATION RATE: 18 BRPM | OXYGEN SATURATION: 99 % | HEART RATE: 107 BPM

## 2021-01-14 DIAGNOSIS — Z98.890 OTHER SPECIFIED POSTPROCEDURAL STATES: Chronic | ICD-10-CM

## 2021-01-14 DIAGNOSIS — C61 MALIGNANT NEOPLASM OF PROSTATE: ICD-10-CM

## 2021-01-14 DIAGNOSIS — N39.9 DISORDER OF URINARY SYSTEM, UNSPECIFIED: ICD-10-CM

## 2021-01-14 DIAGNOSIS — Z90.79 ACQUIRED ABSENCE OF OTHER GENITAL ORGAN(S): Chronic | ICD-10-CM

## 2021-01-14 LAB — SURGICAL PATHOLOGY STUDY: SIGNIFICANT CHANGE UP

## 2021-01-14 RX ORDER — ONDANSETRON 8 MG/1
4 TABLET, FILM COATED ORAL ONCE
Refills: 0 | Status: COMPLETED | OUTPATIENT
Start: 2021-01-14 | End: 2021-01-14

## 2021-01-14 RX ORDER — HYDROMORPHONE HYDROCHLORIDE 2 MG/ML
2 INJECTION INTRAMUSCULAR; INTRAVENOUS; SUBCUTANEOUS ONCE
Refills: 0 | Status: DISCONTINUED | OUTPATIENT
Start: 2021-01-14 | End: 2021-01-14

## 2021-01-14 RX ORDER — SODIUM CHLORIDE 9 MG/ML
1000 INJECTION, SOLUTION INTRAVENOUS
Refills: 0 | Status: DISCONTINUED | OUTPATIENT
Start: 2021-01-14 | End: 2021-01-16

## 2021-01-14 RX ORDER — PANTOPRAZOLE SODIUM 20 MG/1
40 TABLET, DELAYED RELEASE ORAL ONCE
Refills: 0 | Status: COMPLETED | OUTPATIENT
Start: 2021-01-14 | End: 2021-01-14

## 2021-01-14 RX ORDER — CEFTRIAXONE 500 MG/1
1000 INJECTION, POWDER, FOR SOLUTION INTRAMUSCULAR; INTRAVENOUS EVERY 24 HOURS
Refills: 0 | Status: DISCONTINUED | OUTPATIENT
Start: 2021-01-14 | End: 2021-01-19

## 2021-01-14 RX ORDER — HEPARIN SODIUM 5000 [USP'U]/ML
5000 INJECTION INTRAVENOUS; SUBCUTANEOUS EVERY 8 HOURS
Refills: 0 | Status: DISCONTINUED | OUTPATIENT
Start: 2021-01-14 | End: 2021-01-15

## 2021-01-14 RX ADMIN — HYDROMORPHONE HYDROCHLORIDE 2 MILLIGRAM(S): 2 INJECTION INTRAMUSCULAR; INTRAVENOUS; SUBCUTANEOUS at 21:28

## 2021-01-14 RX ADMIN — PANTOPRAZOLE SODIUM 40 MILLIGRAM(S): 20 TABLET, DELAYED RELEASE ORAL at 21:28

## 2021-01-14 RX ADMIN — ONDANSETRON 4 MILLIGRAM(S): 8 TABLET, FILM COATED ORAL at 21:28

## 2021-01-14 NOTE — H&P ADULT - ASSESSMENT
55 y male s/p RALP transferred from [facility] with ARF, pelvic ascites, and Leukocytosis.  r/o Sepsis

## 2021-01-14 NOTE — H&P ADULT - NSICDXPASTSURGICALHX_GEN_ALL_CORE_FT
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

## 2021-01-14 NOTE — H&P ADULT - PROBLEM SELECTOR PLAN 1
verbal instruction
s/p RALP  Obtain repeat Labs  Obtain Blood and Urine CX  Continue Ceftriaxone for present  Obtain CT Cystogram in AM  Analgesia  Antiemetics  Anxiolytics PRN  NPO for now

## 2021-01-14 NOTE — H&P ADULT - HISTORY OF PRESENT ILLNESS
55 y male s/p RALP on 1/4/21.  Pt followed up on 1/11 with Dr. Delgadillo and had successful TOV.  That evening he was experiencing worse abd pain and went to [facility] where he was seen, pain controlled and discharged.  His abd pain continued to worsen and he developed SOB so he returned to [facility] on 1/13 where he was found to have diffuse abd ascites, ARF (Cr=4.5), and Leukocytosis with a left shift.  He was admitted for further workup.  While hospitalized he was reported to be in urinary retention and  had a ghotra placed with approximately 2 L of urine collected.  Today, before transfer to [facility], he underwent US guided Paracentesis.  Pt now transferred to [facility] and c/o severe diffuse abdominal pain.

## 2021-01-15 LAB
ALBUMIN SERPL ELPH-MCNC: 2.7 G/DL — LOW (ref 3.3–5)
ALP SERPL-CCNC: 72 U/L — SIGNIFICANT CHANGE UP (ref 40–120)
ALT FLD-CCNC: 7 U/L — SIGNIFICANT CHANGE UP (ref 4–41)
ANION GAP SERPL CALC-SCNC: 12 MMOL/L — SIGNIFICANT CHANGE UP (ref 7–14)
ANION GAP SERPL CALC-SCNC: 12 MMOL/L — SIGNIFICANT CHANGE UP (ref 7–14)
APTT BLD: 34.5 SEC — SIGNIFICANT CHANGE UP (ref 27–36.3)
AST SERPL-CCNC: 11 U/L — SIGNIFICANT CHANGE UP (ref 4–40)
BASOPHILS # BLD AUTO: 0.02 K/UL — SIGNIFICANT CHANGE UP (ref 0–0.2)
BASOPHILS NFR BLD AUTO: 0.1 % — SIGNIFICANT CHANGE UP (ref 0–2)
BILIRUB SERPL-MCNC: 0.4 MG/DL — SIGNIFICANT CHANGE UP (ref 0.2–1.2)
BLD GP AB SCN SERPL QL: NEGATIVE — SIGNIFICANT CHANGE UP
BUN SERPL-MCNC: 23 MG/DL — SIGNIFICANT CHANGE UP (ref 7–23)
BUN SERPL-MCNC: 44 MG/DL — HIGH (ref 7–23)
CALCIUM SERPL-MCNC: 8.7 MG/DL — SIGNIFICANT CHANGE UP (ref 8.4–10.5)
CALCIUM SERPL-MCNC: 8.9 MG/DL — SIGNIFICANT CHANGE UP (ref 8.4–10.5)
CHLORIDE SERPL-SCNC: 98 MMOL/L — SIGNIFICANT CHANGE UP (ref 98–107)
CHLORIDE SERPL-SCNC: 99 MMOL/L — SIGNIFICANT CHANGE UP (ref 98–107)
CO2 SERPL-SCNC: 20 MMOL/L — LOW (ref 22–31)
CO2 SERPL-SCNC: 22 MMOL/L — SIGNIFICANT CHANGE UP (ref 22–31)
CREAT SERPL-MCNC: 0.71 MG/DL — SIGNIFICANT CHANGE UP (ref 0.5–1.3)
CREAT SERPL-MCNC: 1.37 MG/DL — HIGH (ref 0.5–1.3)
EOSINOPHIL # BLD AUTO: 0.01 K/UL — SIGNIFICANT CHANGE UP (ref 0–0.5)
EOSINOPHIL NFR BLD AUTO: 0.1 % — SIGNIFICANT CHANGE UP (ref 0–6)
GLUCOSE SERPL-MCNC: 116 MG/DL — HIGH (ref 70–99)
GLUCOSE SERPL-MCNC: 89 MG/DL — SIGNIFICANT CHANGE UP (ref 70–99)
HCT VFR BLD CALC: 33.6 % — LOW (ref 39–50)
HCT VFR BLD CALC: 34.3 % — LOW (ref 39–50)
HGB BLD-MCNC: 11.5 G/DL — LOW (ref 13–17)
HGB BLD-MCNC: 11.9 G/DL — LOW (ref 13–17)
IANC: 14.38 K/UL — HIGH (ref 1.5–8.5)
IMM GRANULOCYTES NFR BLD AUTO: 2.9 % — HIGH (ref 0–1.5)
INR BLD: 1.4 RATIO — HIGH (ref 0.88–1.16)
LACTATE SERPL-SCNC: 2 MMOL/L — SIGNIFICANT CHANGE UP (ref 0.5–2)
LYMPHOCYTES # BLD AUTO: 1.03 K/UL — SIGNIFICANT CHANGE UP (ref 1–3.3)
LYMPHOCYTES # BLD AUTO: 6.2 % — LOW (ref 13–44)
MAGNESIUM SERPL-MCNC: 2.3 MG/DL — SIGNIFICANT CHANGE UP (ref 1.6–2.6)
MCHC RBC-ENTMCNC: 29.9 PG — SIGNIFICANT CHANGE UP (ref 27–34)
MCHC RBC-ENTMCNC: 30.7 PG — SIGNIFICANT CHANGE UP (ref 27–34)
MCHC RBC-ENTMCNC: 33.5 GM/DL — SIGNIFICANT CHANGE UP (ref 32–36)
MCHC RBC-ENTMCNC: 35.4 GM/DL — SIGNIFICANT CHANGE UP (ref 32–36)
MCV RBC AUTO: 86.8 FL — SIGNIFICANT CHANGE UP (ref 80–100)
MCV RBC AUTO: 89.1 FL — SIGNIFICANT CHANGE UP (ref 80–100)
MONOCYTES # BLD AUTO: 0.74 K/UL — SIGNIFICANT CHANGE UP (ref 0–0.9)
MONOCYTES NFR BLD AUTO: 4.4 % — SIGNIFICANT CHANGE UP (ref 2–14)
NEUTROPHILS # BLD AUTO: 14.38 K/UL — HIGH (ref 1.8–7.4)
NEUTROPHILS NFR BLD AUTO: 86.3 % — HIGH (ref 43–77)
NRBC # BLD: 0 /100 WBCS — SIGNIFICANT CHANGE UP
NRBC # BLD: 0 /100 WBCS — SIGNIFICANT CHANGE UP
NRBC # FLD: 0 K/UL — SIGNIFICANT CHANGE UP
NRBC # FLD: 0.02 K/UL — HIGH
PHOSPHATE SERPL-MCNC: 2.2 MG/DL — LOW (ref 2.5–4.5)
PLATELET # BLD AUTO: 322 K/UL — SIGNIFICANT CHANGE UP (ref 150–400)
PLATELET # BLD AUTO: 333 K/UL — SIGNIFICANT CHANGE UP (ref 150–400)
POTASSIUM SERPL-MCNC: 4 MMOL/L — SIGNIFICANT CHANGE UP (ref 3.5–5.3)
POTASSIUM SERPL-MCNC: 4.3 MMOL/L — SIGNIFICANT CHANGE UP (ref 3.5–5.3)
POTASSIUM SERPL-SCNC: 4 MMOL/L — SIGNIFICANT CHANGE UP (ref 3.5–5.3)
POTASSIUM SERPL-SCNC: 4.3 MMOL/L — SIGNIFICANT CHANGE UP (ref 3.5–5.3)
PROT SERPL-MCNC: 7 G/DL — SIGNIFICANT CHANGE UP (ref 6–8.3)
PROTHROM AB SERPL-ACNC: 15.7 SEC — HIGH (ref 10.6–13.6)
RBC # BLD: 3.85 M/UL — LOW (ref 4.2–5.8)
RBC # BLD: 3.87 M/UL — LOW (ref 4.2–5.8)
RBC # FLD: 14.1 % — SIGNIFICANT CHANGE UP (ref 10.3–14.5)
RBC # FLD: 14.4 % — SIGNIFICANT CHANGE UP (ref 10.3–14.5)
RH IG SCN BLD-IMP: NEGATIVE — SIGNIFICANT CHANGE UP
SARS-COV-2 IGG SERPL QL IA: NEGATIVE — SIGNIFICANT CHANGE UP
SARS-COV-2 IGM SERPL IA-ACNC: 0.09 INDEX — SIGNIFICANT CHANGE UP
SARS-COV-2 RNA SPEC QL NAA+PROBE: SIGNIFICANT CHANGE UP
SODIUM SERPL-SCNC: 131 MMOL/L — LOW (ref 135–145)
SODIUM SERPL-SCNC: 132 MMOL/L — LOW (ref 135–145)
WBC # BLD: 16.66 K/UL — HIGH (ref 3.8–10.5)
WBC # BLD: 20.15 K/UL — HIGH (ref 3.8–10.5)
WBC # FLD AUTO: 16.66 K/UL — HIGH (ref 3.8–10.5)
WBC # FLD AUTO: 20.15 K/UL — HIGH (ref 3.8–10.5)

## 2021-01-15 PROCEDURE — 99223 1ST HOSP IP/OBS HIGH 75: CPT

## 2021-01-15 PROCEDURE — 74176 CT ABD & PELVIS W/O CONTRAST: CPT | Mod: 26

## 2021-01-15 PROCEDURE — 49406 IMAGE CATH FLUID PERI/RETRO: CPT

## 2021-01-15 RX ORDER — SENNA PLUS 8.6 MG/1
2 TABLET ORAL AT BEDTIME
Refills: 0 | Status: DISCONTINUED | OUTPATIENT
Start: 2021-01-15 | End: 2021-01-20

## 2021-01-15 RX ORDER — HYDROMORPHONE HYDROCHLORIDE 2 MG/ML
1 INJECTION INTRAMUSCULAR; INTRAVENOUS; SUBCUTANEOUS EVERY 4 HOURS
Refills: 0 | Status: DISCONTINUED | OUTPATIENT
Start: 2021-01-15 | End: 2021-01-19

## 2021-01-15 RX ORDER — PANTOPRAZOLE SODIUM 20 MG/1
40 TABLET, DELAYED RELEASE ORAL
Refills: 0 | Status: DISCONTINUED | OUTPATIENT
Start: 2021-01-15 | End: 2021-01-17

## 2021-01-15 RX ORDER — ONDANSETRON 8 MG/1
4 TABLET, FILM COATED ORAL EVERY 6 HOURS
Refills: 0 | Status: DISCONTINUED | OUTPATIENT
Start: 2021-01-15 | End: 2021-01-20

## 2021-01-15 RX ORDER — ACETAMINOPHEN 500 MG
650 TABLET ORAL EVERY 6 HOURS
Refills: 0 | Status: DISCONTINUED | OUTPATIENT
Start: 2021-01-15 | End: 2021-01-17

## 2021-01-15 RX ORDER — NICOTINE POLACRILEX 2 MG
1 GUM BUCCAL DAILY
Refills: 0 | Status: DISCONTINUED | OUTPATIENT
Start: 2021-01-15 | End: 2021-01-17

## 2021-01-15 RX ORDER — HYDROMORPHONE HYDROCHLORIDE 2 MG/ML
2 INJECTION INTRAMUSCULAR; INTRAVENOUS; SUBCUTANEOUS EVERY 4 HOURS
Refills: 0 | Status: DISCONTINUED | OUTPATIENT
Start: 2021-01-15 | End: 2021-01-19

## 2021-01-15 RX ORDER — METOCLOPRAMIDE HCL 10 MG
5 TABLET ORAL ONCE
Refills: 0 | Status: COMPLETED | OUTPATIENT
Start: 2021-01-15 | End: 2021-01-15

## 2021-01-15 RX ORDER — HEPARIN SODIUM 5000 [USP'U]/ML
5000 INJECTION INTRAVENOUS; SUBCUTANEOUS EVERY 8 HOURS
Refills: 0 | Status: DISCONTINUED | OUTPATIENT
Start: 2021-01-15 | End: 2021-01-20

## 2021-01-15 RX ADMIN — HYDROMORPHONE HYDROCHLORIDE 2 MILLIGRAM(S): 2 INJECTION INTRAMUSCULAR; INTRAVENOUS; SUBCUTANEOUS at 20:04

## 2021-01-15 RX ADMIN — HYDROMORPHONE HYDROCHLORIDE 2 MILLIGRAM(S): 2 INJECTION INTRAMUSCULAR; INTRAVENOUS; SUBCUTANEOUS at 06:06

## 2021-01-15 RX ADMIN — HYDROMORPHONE HYDROCHLORIDE 2 MILLIGRAM(S): 2 INJECTION INTRAMUSCULAR; INTRAVENOUS; SUBCUTANEOUS at 00:56

## 2021-01-15 RX ADMIN — CEFTRIAXONE 100 MILLIGRAM(S): 500 INJECTION, POWDER, FOR SOLUTION INTRAMUSCULAR; INTRAVENOUS at 06:26

## 2021-01-15 RX ADMIN — ONDANSETRON 4 MILLIGRAM(S): 8 TABLET, FILM COATED ORAL at 23:14

## 2021-01-15 RX ADMIN — Medication 5 MILLIGRAM(S): at 15:26

## 2021-01-15 RX ADMIN — Medication 30 MILLILITER(S): at 06:08

## 2021-01-15 RX ADMIN — SODIUM CHLORIDE 100 MILLILITER(S): 9 INJECTION, SOLUTION INTRAVENOUS at 23:37

## 2021-01-15 RX ADMIN — HEPARIN SODIUM 5000 UNIT(S): 5000 INJECTION INTRAVENOUS; SUBCUTANEOUS at 23:30

## 2021-01-15 RX ADMIN — SENNA PLUS 2 TABLET(S): 8.6 TABLET ORAL at 21:43

## 2021-01-15 RX ADMIN — HYDROMORPHONE HYDROCHLORIDE 2 MILLIGRAM(S): 2 INJECTION INTRAMUSCULAR; INTRAVENOUS; SUBCUTANEOUS at 23:14

## 2021-01-15 RX ADMIN — HYDROMORPHONE HYDROCHLORIDE 2 MILLIGRAM(S): 2 INJECTION INTRAMUSCULAR; INTRAVENOUS; SUBCUTANEOUS at 15:26

## 2021-01-15 RX ADMIN — PANTOPRAZOLE SODIUM 40 MILLIGRAM(S): 20 TABLET, DELAYED RELEASE ORAL at 06:06

## 2021-01-15 RX ADMIN — HYDROMORPHONE HYDROCHLORIDE 2 MILLIGRAM(S): 2 INJECTION INTRAMUSCULAR; INTRAVENOUS; SUBCUTANEOUS at 11:45

## 2021-01-15 RX ADMIN — HEPARIN SODIUM 5000 UNIT(S): 5000 INJECTION INTRAVENOUS; SUBCUTANEOUS at 15:17

## 2021-01-15 RX ADMIN — HEPARIN SODIUM 5000 UNIT(S): 5000 INJECTION INTRAVENOUS; SUBCUTANEOUS at 06:07

## 2021-01-15 RX ADMIN — ONDANSETRON 4 MILLIGRAM(S): 8 TABLET, FILM COATED ORAL at 09:03

## 2021-01-15 NOTE — CONSULT NOTE ADULT - SUBJECTIVE AND OBJECTIVE BOX
HPI: A 55 y male s/p RALP on 1/4/21.  Pt followed up on 1/11 with Dr. Delgadillo and had successful TOV.  That evening he was experiencing worse abd pain and went to [facility] where he was seen, pain controlled and discharged.  His abd pain continued to worsen and he developed SOB so he returned to [facility] on 1/13 where he was found to have diffuse abd ascites, ARF (Cr=4.5), and Leukocytosis with a left shift.  He was admitted for further workup.  While hospitalized he was reported to be in urinary retention and  had a ghotra placed with approximately 2 L of urine collected.  Today, before transfer to [facility], he underwent US guided Paracentesis. Pt now transferred to [facility] and c/o severe diffuse abdominal pain.    Patient seen and evaluated at bedside. No acute distress evident. Denies any sob, abdominal pain, nausea, vomiting, dizziness, palpitations.     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 No nausea, vomiting, or hematemesis; No diarrhea or constipation. No melena or hematochezia.  GENITOURINARY: No dysuria, frequency, hematuria, or incontinence  NEUROLOGICAL: No headaches, memory loss, loss of strength, numbness, or tremors  SKIN: No itching, burning, rashes, or lesions   LYMPH NODES: No enlarged glands  ENDOCRINE: No heat or cold intolerance; No hair loss  MUSCULOSKELETAL: No joint pain or swelling; No muscle, back, or extremity pain  HEME/LYMPH: No easy bruising, or bleeding gums  ALLERY AND IMMUNOLOGIC: No hives or eczema    Allergies    No Known Allergies    Intolerances    Social History:     FAMILY HISTORY:    MEDICATIONS  (STANDING):  cefTRIAXone   IVPB 1000 milliGRAM(s) IV Intermittent every 24 hours  dextrose 5% + sodium chloride 0.45%. 1000 milliLiter(s) (100 mL/Hr) IV Continuous <Continuous>  heparin   Injectable 5000 Unit(s) SubCutaneous every 8 hours  metoclopramide Injectable 5 milliGRAM(s) IV Push once  pantoprazole    Tablet 40 milliGRAM(s) Oral before breakfast  senna 2 Tablet(s) Oral at bedtime    MEDICATIONS  (PRN):  acetaminophen   Tablet .. 650 milliGRAM(s) Oral every 6 hours PRN Temp greater or equal to 38C (100.4F), Mild Pain (1 - 3)  aluminum hydroxide/magnesium hydroxide/simethicone Suspension 30 milliLiter(s) Oral every 4 hours PRN Dyspepsia  HYDROmorphone   Tablet 1 milliGRAM(s) Oral every 4 hours PRN Moderate Pain (4 - 6)  HYDROmorphone   Tablet 2 milliGRAM(s) Oral every 4 hours PRN Severe Pain (7 - 10)  LORazepam     Tablet 0.5 milliGRAM(s) Oral every 12 hours PRN Anxiety  ondansetron Injectable 4 milliGRAM(s) IV Push every 6 hours PRN Nausea and/or Vomiting    Vital Signs Last 24 Hrs  T(C): 36.2 (15 Jaycob 2021 12:47), Max: 37.2 (14 Jan 2021 20:17)  T(F): 97.1 (15 Jaycob 2021 12:47), Max: 98.9 (14 Jan 2021 20:17)  HR: 74 (15 Jaycob 2021 12:47) (74 - 107)  BP: 135/72 (15 Jaycob 2021 12:47) (134/77 - 160/79)  BP(mean): --  RR: 16 (15 Jaycob 2021 12:47) (16 - 18)  SpO2: 100% (15 Jaycob 2021 12:47) (98% - 100%)  CAPILLARY BLOOD GLUCOSE    PHYSICAL EXAM:  GENERAL: NAD, well-developed  HEAD:  Atraumatic, Normocephalic  EYES: EOMI, conjunctiva and sclera clear  NECK: Supple, No JVD  CHEST/LUNG: Clear to auscultation bilaterally; No wheeze  HEART: Regular rate and rhythm; No murmurs, rubs, or gallops  ABDOMEN: Soft, Nontender, Nondistended; Bowel sounds present  EXTREMITIES:  2+ Peripheral Pulses, No clubbing, cyanosis, or edema  NEUROLOGY: non-focal  SKIN: No rashes or lesions    LABS: reviewed                         11.9 20.15 )-----------( 322      ( 15 Jaycob 2021 01:45 )             33.6     01-15    131<L>  |  99  |  44<H>  ----------------------------<  116<H>  4.3   |  20<L>  |  1.37<H>    Ca    8.9      15 Jaycob 2021 01:45    PT/INR - ( 15 Jaycob 2021 01:45 )   PT: 15.7 sec;   INR: 1.40 ratio      PTT - ( 15 Jaycob 2021 01:45 )  PTT:34.5 sec    EKG(personally reviewed):    RADIOLOGY & ADDITIONAL TESTS:    Imaging Personally Reviewed:    Consultant(s) Notes Reviewed:      Care Discussed with Consultants/Other Providers:   HPI: A 55 y male s/p RALP on 1/4/21.  Pt followed up on 1/11 with Dr. Delgadillo and had successful TOV.  That evening he was experiencing worse abd pain and went to [facility] where he was seen, pain controlled and discharged.  His abd pain continued to worsen and he developed SOB so he returned to [facility] on 1/13 where he was found to have diffuse abd ascites, ARF (Cr=4.5), and Leukocytosis with a left shift.  He was admitted for further workup.  While hospitalized he was reported to be in urinary retention and  had a ghotra placed with approximately 2 L of urine collected.  Today, before transfer to [facility], he underwent US guided Paracentesis. Pt now transferred to [facility] and c/o severe diffuse abdominal pain.    Patient seen and evaluated at bedside. No acute distress evident. Denies any sob, abdominal pain, nausea, vomiting, dizziness, palpitations.     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   GENITOURINARY: No dysuria, frequency, hematuria, or incontinence  NEUROLOGICAL: No headaches, memory loss, loss of strength, numbness, or tremors  SKIN: No itching, burning, rashes, or lesions   ENDOCRINE: No heat or cold intolerance; No hair loss  MUSCULOSKELETAL: No joint pain or swelling; No muscle, back, or extremity pain    Allergies    No Known Allergies    Intolerances    Social History:     FAMILY HISTORY:    MEDICATIONS  (STANDING):  cefTRIAXone   IVPB 1000 milliGRAM(s) IV Intermittent every 24 hours  dextrose 5% + sodium chloride 0.45%. 1000 milliLiter(s) (100 mL/Hr) IV Continuous <Continuous>  heparin   Injectable 5000 Unit(s) SubCutaneous every 8 hours  metoclopramide Injectable 5 milliGRAM(s) IV Push once  pantoprazole    Tablet 40 milliGRAM(s) Oral before breakfast  senna 2 Tablet(s) Oral at bedtime    MEDICATIONS  (PRN):  acetaminophen   Tablet .. 650 milliGRAM(s) Oral every 6 hours PRN Temp greater or equal to 38C (100.4F), Mild Pain (1 - 3)  aluminum hydroxide/magnesium hydroxide/simethicone Suspension 30 milliLiter(s) Oral every 4 hours PRN Dyspepsia  HYDROmorphone   Tablet 1 milliGRAM(s) Oral every 4 hours PRN Moderate Pain (4 - 6)  HYDROmorphone   Tablet 2 milliGRAM(s) Oral every 4 hours PRN Severe Pain (7 - 10)  LORazepam     Tablet 0.5 milliGRAM(s) Oral every 12 hours PRN Anxiety  ondansetron Injectable 4 milliGRAM(s) IV Push every 6 hours PRN Nausea and/or Vomiting    Vital Signs Last 24 Hrs  T(C): 36.2 (15 Jaycob 2021 12:47), Max: 37.2 (14 Jan 2021 20:17)  T(F): 97.1 (15 Jaycob 2021 12:47), Max: 98.9 (14 Jan 2021 20:17)  HR: 74 (15 Jaycob 2021 12:47) (74 - 107)  BP: 135/72 (15 Jaycob 2021 12:47) (134/77 - 160/79)  BP(mean): --  RR: 16 (15 Jaycob 2021 12:47) (16 - 18)  SpO2: 100% (15 Jaycob 2021 12:47) (98% - 100%)  CAPILLARY BLOOD GLUCOSE    PHYSICAL EXAM:  GENERAL: NAD, well-developed, middle aged man, frail.   HEAD:  Atraumatic, Normocephalic  EYES: EOMI, conjunctiva and sclera clear  NECK: Supple, No JVD  CHEST/LUNG: Clear to auscultation bilaterally; No wheeze  HEART: Regular rate and rhythm; No murmurs, rubs, or gallops  ABDOMEN: Soft, tender+, Nondistended; Bowel sounds present  EXTREMITIES:  2+ Peripheral Pulses, No clubbing, cyanosis, or edema  NEUROLOGY: non-focal  SKIN: No rashes or lesions    LABS: reviewed                         11.9   20.15 )-----------( 322      ( 15 Jaycob 2021 01:45 )             33.6     01-15    131<L>  |  99  |  44<H>  ----------------------------<  116<H>  4.3   |  20<L>  |  1.37<H>    Ca    8.9      15 Jaycob 2021 01:45    PT/INR - ( 15 Jaycob 2021 01:45 )   PT: 15.7 sec;   INR: 1.40 ratio      PTT - ( 15 Jaycob 2021 01:45 )  PTT:34.5 sec    EKG(personally reviewed):    RADIOLOGY & ADDITIONAL TESTS:    Imaging Personally Reviewed:    Consultant(s) Notes Reviewed:      Care Discussed with Consultants/Other Providers:

## 2021-01-15 NOTE — PROGRESS NOTE ADULT - ATTENDING COMMENTS
Patient seen and examined.  Abdomen mildly distended and tender but soft.   Villanueva catheter draining clear urine  Suspect intraabdominal urine leak secondary to anastomotic leak.  CT cystogram pending.  Continue IV ceftriaxone.  Will obtain culture results from [facility].  Continue NPO  Spoke with the patient and his wife.

## 2021-01-15 NOTE — CONSULT NOTE ADULT - PROBLEM SELECTOR RECOMMENDATION 9
- On Tylenol PO, Dilaudid PO PRN.   - Management per primary team. - Patient reports pain is better controlled.   - On Tylenol PO, Dilaudid PO PRN.   - Zofran/Reglan PRN nausea/ hiccups.   - Management per primary team.

## 2021-01-15 NOTE — CONSULT NOTE ADULT - ASSESSMENT
A 55M prostate cancer, retention of urine requiring ghotra, been having lower abdominal and pelvic pain s/p recent robotic assisted prostatectomy 1/4/21 now transferred from [facility] with ARF, pelvic ascites, and Leukocytosis, r/o Sepsis with plan for CT cystogram.

## 2021-01-15 NOTE — CONSULT NOTE ADULT - PROBLEM SELECTOR RECOMMENDATION 2
- s/p RALP  - Obtain repeat Labs- STILL PENDING.   - Obtain Blood and Urine CX- FOLLOW UP.  - Continue Ceftriaxone for present.   - FOLLOW UP CT Cystogram in AM- PENDING.   - Symptom control: Pain, Nausea, Anxiety. Tylenol PO, Dilaudid PO, Ativan, Zofran.   - Currently NPO, advance diet per primary.   - Management per primary team. - s/p RALP  - Obtain repeat Labs- STILL PENDING.   - Obtain Blood and Urine CX- FOLLOW UP.  - Continue Ceftriaxone for present.   - FOLLOW UP CT Cystogram in AM- PENDING.   - PLAN FOR IR Guided Drainage of pelvic collection anastomatic leak  - Symptom control: Pain, Nausea, Anxiety. Tylenol PO, Dilaudid PO, Ativan, Zofran.   - Currently NPO, advance diet per primary.   - Management per primary team.

## 2021-01-15 NOTE — CHART NOTE - NSCHARTNOTEFT_GEN_A_CORE
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

## 2021-01-16 DIAGNOSIS — R06.6 HICCOUGH: ICD-10-CM

## 2021-01-16 DIAGNOSIS — Z29.9 ENCOUNTER FOR PROPHYLACTIC MEASURES, UNSPECIFIED: ICD-10-CM

## 2021-01-16 DIAGNOSIS — N39.0 URINARY TRACT INFECTION, SITE NOT SPECIFIED: ICD-10-CM

## 2021-01-16 DIAGNOSIS — E46 UNSPECIFIED PROTEIN-CALORIE MALNUTRITION: ICD-10-CM

## 2021-01-16 LAB
GRAM STN FLD: SIGNIFICANT CHANGE UP
SPECIMEN SOURCE: SIGNIFICANT CHANGE UP

## 2021-01-16 PROCEDURE — 99233 SBSQ HOSP IP/OBS HIGH 50: CPT

## 2021-01-16 RX ORDER — METOCLOPRAMIDE HCL 10 MG
10 TABLET ORAL ONCE
Refills: 0 | Status: COMPLETED | OUTPATIENT
Start: 2021-01-16 | End: 2021-01-16

## 2021-01-16 RX ORDER — PANTOPRAZOLE SODIUM 20 MG/1
40 TABLET, DELAYED RELEASE ORAL ONCE
Refills: 0 | Status: COMPLETED | OUTPATIENT
Start: 2021-01-16 | End: 2021-01-16

## 2021-01-16 RX ORDER — BACLOFEN 100 %
5 POWDER (GRAM) MISCELLANEOUS EVERY 8 HOURS
Refills: 0 | Status: DISCONTINUED | OUTPATIENT
Start: 2021-01-16 | End: 2021-01-20

## 2021-01-16 RX ORDER — METOCLOPRAMIDE HCL 10 MG
10 TABLET ORAL EVERY 6 HOURS
Refills: 0 | Status: DISCONTINUED | OUTPATIENT
Start: 2021-01-16 | End: 2021-01-20

## 2021-01-16 RX ADMIN — HEPARIN SODIUM 5000 UNIT(S): 5000 INJECTION INTRAVENOUS; SUBCUTANEOUS at 05:04

## 2021-01-16 RX ADMIN — HYDROMORPHONE HYDROCHLORIDE 2 MILLIGRAM(S): 2 INJECTION INTRAMUSCULAR; INTRAVENOUS; SUBCUTANEOUS at 13:49

## 2021-01-16 RX ADMIN — SENNA PLUS 2 TABLET(S): 8.6 TABLET ORAL at 22:09

## 2021-01-16 RX ADMIN — ONDANSETRON 4 MILLIGRAM(S): 8 TABLET, FILM COATED ORAL at 13:49

## 2021-01-16 RX ADMIN — ONDANSETRON 4 MILLIGRAM(S): 8 TABLET, FILM COATED ORAL at 07:44

## 2021-01-16 RX ADMIN — CEFTRIAXONE 100 MILLIGRAM(S): 500 INJECTION, POWDER, FOR SOLUTION INTRAMUSCULAR; INTRAVENOUS at 05:05

## 2021-01-16 RX ADMIN — Medication 10 MILLIGRAM(S): at 18:39

## 2021-01-16 RX ADMIN — HYDROMORPHONE HYDROCHLORIDE 2 MILLIGRAM(S): 2 INJECTION INTRAMUSCULAR; INTRAVENOUS; SUBCUTANEOUS at 03:28

## 2021-01-16 RX ADMIN — HEPARIN SODIUM 5000 UNIT(S): 5000 INJECTION INTRAVENOUS; SUBCUTANEOUS at 22:10

## 2021-01-16 RX ADMIN — HEPARIN SODIUM 5000 UNIT(S): 5000 INJECTION INTRAVENOUS; SUBCUTANEOUS at 13:49

## 2021-01-16 RX ADMIN — Medication 10 MILLIGRAM(S): at 03:29

## 2021-01-16 RX ADMIN — HYDROMORPHONE HYDROCHLORIDE 2 MILLIGRAM(S): 2 INJECTION INTRAMUSCULAR; INTRAVENOUS; SUBCUTANEOUS at 22:55

## 2021-01-16 RX ADMIN — Medication 10 MILLIGRAM(S): at 08:59

## 2021-01-16 RX ADMIN — ONDANSETRON 4 MILLIGRAM(S): 8 TABLET, FILM COATED ORAL at 22:55

## 2021-01-16 RX ADMIN — Medication 5 MILLIGRAM(S): at 22:08

## 2021-01-16 RX ADMIN — Medication 5 MILLIGRAM(S): at 11:46

## 2021-01-16 RX ADMIN — HYDROMORPHONE HYDROCHLORIDE 2 MILLIGRAM(S): 2 INJECTION INTRAMUSCULAR; INTRAVENOUS; SUBCUTANEOUS at 18:40

## 2021-01-16 RX ADMIN — HYDROMORPHONE HYDROCHLORIDE 2 MILLIGRAM(S): 2 INJECTION INTRAMUSCULAR; INTRAVENOUS; SUBCUTANEOUS at 07:43

## 2021-01-16 RX ADMIN — PANTOPRAZOLE SODIUM 40 MILLIGRAM(S): 20 TABLET, DELAYED RELEASE ORAL at 05:04

## 2021-01-17 LAB
-  AMIKACIN: SIGNIFICANT CHANGE UP
-  AMOXICILLIN/CLAVULANIC ACID: SIGNIFICANT CHANGE UP
-  AMPICILLIN/SULBACTAM: SIGNIFICANT CHANGE UP
-  AMPICILLIN: SIGNIFICANT CHANGE UP
-  AZTREONAM: SIGNIFICANT CHANGE UP
-  CEFAZOLIN: SIGNIFICANT CHANGE UP
-  CEFEPIME: SIGNIFICANT CHANGE UP
-  CEFOXITIN: SIGNIFICANT CHANGE UP
-  CEFTRIAXONE: SIGNIFICANT CHANGE UP
-  CIPROFLOXACIN: SIGNIFICANT CHANGE UP
-  ERTAPENEM: SIGNIFICANT CHANGE UP
-  GENTAMICIN: SIGNIFICANT CHANGE UP
-  IMIPENEM: SIGNIFICANT CHANGE UP
-  LEVOFLOXACIN: SIGNIFICANT CHANGE UP
-  MEROPENEM: SIGNIFICANT CHANGE UP
-  NITROFURANTOIN: SIGNIFICANT CHANGE UP
-  NITROFURANTOIN: SIGNIFICANT CHANGE UP
-  PIPERACILLIN/TAZOBACTAM: SIGNIFICANT CHANGE UP
-  TIGECYCLINE: SIGNIFICANT CHANGE UP
-  TIGECYCLINE: SIGNIFICANT CHANGE UP
-  TOBRAMYCIN: SIGNIFICANT CHANGE UP
-  TRIMETHOPRIM/SULFAMETHOXAZOLE: SIGNIFICANT CHANGE UP
CULTURE RESULTS: SIGNIFICANT CHANGE UP
METHOD TYPE: SIGNIFICANT CHANGE UP
ORGANISM # SPEC MICROSCOPIC CNT: SIGNIFICANT CHANGE UP
SPECIMEN SOURCE: SIGNIFICANT CHANGE UP

## 2021-01-17 PROCEDURE — 99233 SBSQ HOSP IP/OBS HIGH 50: CPT

## 2021-01-17 RX ORDER — LIDOCAINE 4 G/100G
1 CREAM TOPICAL
Refills: 0 | Status: DISCONTINUED | OUTPATIENT
Start: 2021-01-17 | End: 2021-01-20

## 2021-01-17 RX ORDER — KETOROLAC TROMETHAMINE 30 MG/ML
15 SYRINGE (ML) INJECTION EVERY 6 HOURS
Refills: 0 | Status: DISCONTINUED | OUTPATIENT
Start: 2021-01-17 | End: 2021-01-18

## 2021-01-17 RX ORDER — DIAZEPAM 5 MG
5 TABLET ORAL
Refills: 0 | Status: DISCONTINUED | OUTPATIENT
Start: 2021-01-17 | End: 2021-01-19

## 2021-01-17 RX ORDER — ACETAMINOPHEN 500 MG
975 TABLET ORAL EVERY 6 HOURS
Refills: 0 | Status: DISCONTINUED | OUTPATIENT
Start: 2021-01-17 | End: 2021-01-20

## 2021-01-17 RX ORDER — PANTOPRAZOLE SODIUM 20 MG/1
20 TABLET, DELAYED RELEASE ORAL
Refills: 0 | Status: DISCONTINUED | OUTPATIENT
Start: 2021-01-17 | End: 2021-01-17

## 2021-01-17 RX ORDER — PANTOPRAZOLE SODIUM 20 MG/1
40 TABLET, DELAYED RELEASE ORAL EVERY 12 HOURS
Refills: 0 | Status: DISCONTINUED | OUTPATIENT
Start: 2021-01-17 | End: 2021-01-20

## 2021-01-17 RX ADMIN — HYDROMORPHONE HYDROCHLORIDE 2 MILLIGRAM(S): 2 INJECTION INTRAMUSCULAR; INTRAVENOUS; SUBCUTANEOUS at 02:55

## 2021-01-17 RX ADMIN — PANTOPRAZOLE SODIUM 40 MILLIGRAM(S): 20 TABLET, DELAYED RELEASE ORAL at 07:05

## 2021-01-17 RX ADMIN — HEPARIN SODIUM 5000 UNIT(S): 5000 INJECTION INTRAVENOUS; SUBCUTANEOUS at 13:23

## 2021-01-17 RX ADMIN — HYDROMORPHONE HYDROCHLORIDE 2 MILLIGRAM(S): 2 INJECTION INTRAMUSCULAR; INTRAVENOUS; SUBCUTANEOUS at 07:02

## 2021-01-17 RX ADMIN — HEPARIN SODIUM 5000 UNIT(S): 5000 INJECTION INTRAVENOUS; SUBCUTANEOUS at 07:05

## 2021-01-17 RX ADMIN — HEPARIN SODIUM 5000 UNIT(S): 5000 INJECTION INTRAVENOUS; SUBCUTANEOUS at 21:35

## 2021-01-17 RX ADMIN — ONDANSETRON 4 MILLIGRAM(S): 8 TABLET, FILM COATED ORAL at 21:35

## 2021-01-17 RX ADMIN — Medication 10 MILLIGRAM(S): at 02:55

## 2021-01-17 RX ADMIN — HYDROMORPHONE HYDROCHLORIDE 2 MILLIGRAM(S): 2 INJECTION INTRAMUSCULAR; INTRAVENOUS; SUBCUTANEOUS at 21:35

## 2021-01-17 RX ADMIN — PANTOPRAZOLE SODIUM 40 MILLIGRAM(S): 20 TABLET, DELAYED RELEASE ORAL at 16:12

## 2021-01-17 RX ADMIN — CEFTRIAXONE 100 MILLIGRAM(S): 500 INJECTION, POWDER, FOR SOLUTION INTRAMUSCULAR; INTRAVENOUS at 07:02

## 2021-01-17 RX ADMIN — Medication 15 MILLIGRAM(S): at 10:26

## 2021-01-17 RX ADMIN — HYDROMORPHONE HYDROCHLORIDE 2 MILLIGRAM(S): 2 INJECTION INTRAMUSCULAR; INTRAVENOUS; SUBCUTANEOUS at 16:12

## 2021-01-17 RX ADMIN — LIDOCAINE 1 APPLICATION(S): 4 CREAM TOPICAL at 21:36

## 2021-01-17 NOTE — DIETITIAN INITIAL EVALUATION ADULT. - ADD RECOMMEND
1. Advance diet as tolerate when medically appropriate per MD order. Continue Ensure Enlive 240mls 2x daily (700kcal, 40g protein) and Ensure Pudding 2x daily (340kcal, 8 g protein). 2. Monitor weights, labs, BM's, skin integrity, PO intake/tolerance. 3. Encourage po intake, assist with meals and menu selections, provide alternatives PRN. 4. RD remains available and will f/u per protocols, please re-consult RD as needed.

## 2021-01-17 NOTE — DIETITIAN INITIAL EVALUATION ADULT. - PERTINENT MEDS FT
MEDICATIONS  (STANDING):  acetaminophen   Tablet .. 975 milliGRAM(s) Oral every 6 hours  cefTRIAXone   IVPB 1000 milliGRAM(s) IV Intermittent every 24 hours  heparin   Injectable 5000 Unit(s) SubCutaneous every 8 hours  nicotine - 21 mG/24Hr(s) Patch 1 patch Transdermal daily  pantoprazole    Tablet 40 milliGRAM(s) Oral before breakfast  senna 2 Tablet(s) Oral at bedtime    MEDICATIONS  (PRN):  aluminum hydroxide/magnesium hydroxide/simethicone Suspension 30 milliLiter(s) Oral every 4 hours PRN Dyspepsia  baclofen 5 milliGRAM(s) Oral every 8 hours PRN hiccups  diazepam    Tablet 5 milliGRAM(s) Oral two times a day PRN anxiety  HYDROmorphone   Tablet 1 milliGRAM(s) Oral every 4 hours PRN Moderate Pain (4 - 6)  HYDROmorphone   Tablet 2 milliGRAM(s) Oral every 4 hours PRN Severe Pain (7 - 10)  ketorolac   Injectable 15 milliGRAM(s) IV Push every 6 hours PRN Mild Pain (1 - 3)  metoclopramide Injectable 10 milliGRAM(s) IV Push every 6 hours PRN hiccups  ondansetron Injectable 4 milliGRAM(s) IV Push every 6 hours PRN Nausea and/or Vomiting

## 2021-01-17 NOTE — DIETITIAN INITIAL EVALUATION ADULT. - NS FNS REASON FOR WEIGHT CHANG
Patient Education        Recovering From Depression: Care Instructions  Your Care Instructions    Taking good care of yourself is important as you recover from depression. In time, your symptoms will fade as your treatment takes hold. Do not give up. Instead, focus your energy on getting better. Your mood will improve. It just takes some time. Focus on things that can help you feel better, such as being with friends and family, eating well, and getting enough rest. But take things slowly. Do not do too much too soon. You will begin to feel better gradually. Follow-up care is a key part of your treatment and safety. Be sure to make and go to all appointments, and call your doctor if you are having problems. It's also a good idea to know your test results and keep a list of the medicines you take. How can you care for yourself at home? Be realistic  · If you have a large task to do, break it up into smaller steps you can handle, and just do what you can. · You may want to put off important decisions until your depression has lifted. If you have plans that will have a major impact on your life, such as marriage, divorce, or a job change, try to wait a bit. Talk it over with friends and loved ones who can help you look at the overall picture first.  · Reaching out to people for help is important. Do not isolate yourself. Let your family and friends help you. Find someone you can trust and confide in, and talk to that person. · Be patient, and be kind to yourself. Remember that depression is not your fault and is not something you can overcome with willpower alone. Treatment is necessary for depression, just like for any other illness. Feeling better takes time, and your mood will improve little by little. Stay active  · Stay busy and get outside. Take a walk, or try some other light exercise. · Talk with your doctor about an exercise program. Exercise can help with mild depression. · Go to a movie or concert. decreased po intake

## 2021-01-17 NOTE — DIETITIAN INITIAL EVALUATION ADULT. - REASON FOR ADMISSION
54 yo M s/p RALP on 1/4/21.  Pt followed up on 1/11 with Dr. Delgadillo and had successful TOV.  That evening he was experiencing increasing abd pain and went to [facility] where he was seen, pain controlled and discharged.  His abd pain continued to worsen and he developed SOB so he returned to [facility] on 1/13 where he was found to have diffuse abd ascites, ARF (Cr=4.5), and Leukocytosis with a left shift.  He was admitted for further workup.  While hospitalized he was reported to be in urinary retention and  had a ghotra placed with approximately 2 L of urine collected, he then underwent US guided paracentesis with removal of 1500cc of fluid.  Pt transferred to [facility] for further management.

## 2021-01-17 NOTE — DIETITIAN INITIAL EVALUATION ADULT. - OTHER INFO
Patient reports current appetite remains poor due to hiccups, abd pain, nausea, but no vomit. Noted patient on reglan and zofran PRN. Patient is aware of current FLD, states he has not try the Ensure Enlive and Ensure Pudding as he is fear to eat too much and causes worsening of GI symptoms, but no swallowing difficulty on current diet per patient. RD encouraged PO and nutrition supplement intake as tolerated with small and frequent po, patient verbalized understanding is amenable to try nutrition supplements currently ordered per MD to optimize po intake. Per urology, patient had a BM per his account after IR drain placement but not since, passing flatus, tolerating FLD.    Skin: No pressure injuries; noted 6 abd lap sites.  Edema: None noted per flowsheet.

## 2021-01-17 NOTE — DIETITIAN INITIAL EVALUATION ADULT. - ORAL INTAKE PTA/DIET HISTORY
Per patient, appetite has been poor over the past ~2.5 months with severe wt loss of 30# from #. Patient confirmed NKFA. Patient has missing teeth but denies chewing/swallowing difficulties prior to admission.

## 2021-01-17 NOTE — DIETITIAN NUTRITION RISK NOTIFICATION - TREATMENT: THE FOLLOWING DIET HAS BEEN RECOMMENDED
1. Advance diet as tolerate when medically appropriate per MD order. Continue Ensure Enlive 240mls 2x daily (700kcal, 40g protein) and Ensure Pudding 2x daily (340kcal, 8 g protein). 2. Monitor weights, labs, BM's, skin integrity, PO intake/tolerance. 3. Encourage po intake, assist with meals and menu selections, provide alternatives PRN. 4. monitor Na+ and phos -- replete/correct as clinically indicated per MD order.    Please refer to nutrition assessment completed on 1/17/21 for other details.

## 2021-01-17 NOTE — DIETITIAN INITIAL EVALUATION ADULT. - PERTINENT LABORATORY DATA
01-15 Na132 mmol/L<L> Glu 89 mg/dL K+ 4.0 mmol/L Cr  0.71 mg/dL BUN 23 mg/dL 01-15 Phos 2.2 mg/dL<L> 01-15 Alb 2.7 g/dL<L>

## 2021-01-18 PROCEDURE — 99233 SBSQ HOSP IP/OBS HIGH 50: CPT

## 2021-01-18 RX ORDER — POLYETHYLENE GLYCOL 3350 17 G/17G
17 POWDER, FOR SOLUTION ORAL DAILY
Refills: 0 | Status: DISCONTINUED | OUTPATIENT
Start: 2021-01-18 | End: 2021-01-20

## 2021-01-18 RX ORDER — LANOLIN ALCOHOL/MO/W.PET/CERES
5 CREAM (GRAM) TOPICAL AT BEDTIME
Refills: 0 | Status: DISCONTINUED | OUTPATIENT
Start: 2021-01-18 | End: 2021-01-18

## 2021-01-18 RX ORDER — KETOROLAC TROMETHAMINE 30 MG/ML
15 SYRINGE (ML) INJECTION EVERY 6 HOURS
Refills: 0 | Status: DISCONTINUED | OUTPATIENT
Start: 2021-01-18 | End: 2021-01-19

## 2021-01-18 RX ORDER — DRONABINOL 2.5 MG
2.5 CAPSULE ORAL
Refills: 0 | Status: DISCONTINUED | OUTPATIENT
Start: 2021-01-18 | End: 2021-01-20

## 2021-01-18 RX ORDER — LANOLIN ALCOHOL/MO/W.PET/CERES
3 CREAM (GRAM) TOPICAL AT BEDTIME
Refills: 0 | Status: DISCONTINUED | OUTPATIENT
Start: 2021-01-18 | End: 2021-01-20

## 2021-01-18 RX ADMIN — HYDROMORPHONE HYDROCHLORIDE 2 MILLIGRAM(S): 2 INJECTION INTRAMUSCULAR; INTRAVENOUS; SUBCUTANEOUS at 12:54

## 2021-01-18 RX ADMIN — Medication 3 MILLIGRAM(S): at 21:43

## 2021-01-18 RX ADMIN — HEPARIN SODIUM 5000 UNIT(S): 5000 INJECTION INTRAVENOUS; SUBCUTANEOUS at 21:42

## 2021-01-18 RX ADMIN — HEPARIN SODIUM 5000 UNIT(S): 5000 INJECTION INTRAVENOUS; SUBCUTANEOUS at 16:20

## 2021-01-18 RX ADMIN — ONDANSETRON 4 MILLIGRAM(S): 8 TABLET, FILM COATED ORAL at 04:46

## 2021-01-18 RX ADMIN — Medication 2.5 MILLIGRAM(S): at 12:55

## 2021-01-18 RX ADMIN — Medication 15 MILLIGRAM(S): at 16:20

## 2021-01-18 RX ADMIN — Medication 15 MILLIGRAM(S): at 00:50

## 2021-01-18 RX ADMIN — HYDROMORPHONE HYDROCHLORIDE 2 MILLIGRAM(S): 2 INJECTION INTRAMUSCULAR; INTRAVENOUS; SUBCUTANEOUS at 21:42

## 2021-01-18 RX ADMIN — LIDOCAINE 1 APPLICATION(S): 4 CREAM TOPICAL at 04:46

## 2021-01-18 RX ADMIN — PANTOPRAZOLE SODIUM 40 MILLIGRAM(S): 20 TABLET, DELAYED RELEASE ORAL at 04:45

## 2021-01-18 RX ADMIN — HEPARIN SODIUM 5000 UNIT(S): 5000 INJECTION INTRAVENOUS; SUBCUTANEOUS at 04:45

## 2021-01-18 RX ADMIN — Medication 15 MILLIGRAM(S): at 21:42

## 2021-01-18 RX ADMIN — Medication 15 MILLIGRAM(S): at 08:14

## 2021-01-18 RX ADMIN — CEFTRIAXONE 100 MILLIGRAM(S): 500 INJECTION, POWDER, FOR SOLUTION INTRAMUSCULAR; INTRAVENOUS at 04:46

## 2021-01-18 RX ADMIN — PANTOPRAZOLE SODIUM 40 MILLIGRAM(S): 20 TABLET, DELAYED RELEASE ORAL at 18:34

## 2021-01-18 RX ADMIN — HYDROMORPHONE HYDROCHLORIDE 2 MILLIGRAM(S): 2 INJECTION INTRAMUSCULAR; INTRAVENOUS; SUBCUTANEOUS at 04:46

## 2021-01-18 NOTE — PHYSICAL THERAPY INITIAL EVALUATION ADULT - ADDITIONAL COMMENTS
Pt live at home with wife. 1 step to enter. Pt was independent in functional activities prior to admission without use of assistive device.     Pt left seated in bedside chair, NAD. +call bell. RN aware of session.

## 2021-01-18 NOTE — PHYSICAL THERAPY INITIAL EVALUATION ADULT - GENERAL OBSERVATIONS, REHAB EVAL
Pt received seated in bedside chair, NAD. Pt agreeable to PT consultation. Cleared for PT as per JANAK Zavala

## 2021-01-18 NOTE — PROVIDER CONTACT NOTE (OTHER) - REASON
Patient's ghotra is leaking at the insertion site
Patient is complaining of a burning feeling at the tip of his penis

## 2021-01-18 NOTE — PHYSICAL THERAPY INITIAL EVALUATION ADULT - PERTINENT HX OF CURRENT PROBLEM, REHAB EVAL
55 y.o. Male prostate cancer, retention of urine requiring ghotra, been having lower abdominal and pelvic pain s/p recent robotic assisted prostatectomy 1/4/21 now transferred from [facility] with ARF, pelvic ascites, and Leukocytosis, rule out Sepsis with plan for CT cystogram.

## 2021-01-18 NOTE — PROVIDER CONTACT NOTE (OTHER) - ACTION/TREATMENT ORDERED:
MD notified, lidocaine ointment ordered.
MD notified, will come to bedside to see patient. Will continue to monitor.

## 2021-01-19 DIAGNOSIS — F41.9 ANXIETY DISORDER, UNSPECIFIED: ICD-10-CM

## 2021-01-19 PROCEDURE — 99231 SBSQ HOSP IP/OBS SF/LOW 25: CPT

## 2021-01-19 PROCEDURE — 99233 SBSQ HOSP IP/OBS HIGH 50: CPT

## 2021-01-19 RX ORDER — OXYCODONE HYDROCHLORIDE 5 MG/1
10 TABLET ORAL EVERY 4 HOURS
Refills: 0 | Status: DISCONTINUED | OUTPATIENT
Start: 2021-01-19 | End: 2021-01-20

## 2021-01-19 RX ORDER — OXYCODONE HYDROCHLORIDE 5 MG/1
5 TABLET ORAL EVERY 4 HOURS
Refills: 0 | Status: DISCONTINUED | OUTPATIENT
Start: 2021-01-19 | End: 2021-01-20

## 2021-01-19 RX ORDER — DIAZEPAM 5 MG
5 TABLET ORAL
Refills: 0 | Status: DISCONTINUED | OUTPATIENT
Start: 2021-01-19 | End: 2021-01-20

## 2021-01-19 RX ORDER — CEFPODOXIME PROXETIL 100 MG
200 TABLET ORAL
Refills: 0 | Status: DISCONTINUED | OUTPATIENT
Start: 2021-01-19 | End: 2021-01-20

## 2021-01-19 RX ORDER — IBUPROFEN 200 MG
600 TABLET ORAL EVERY 6 HOURS
Refills: 0 | Status: DISCONTINUED | OUTPATIENT
Start: 2021-01-19 | End: 2021-01-20

## 2021-01-19 RX ADMIN — Medication 600 MILLIGRAM(S): at 10:52

## 2021-01-19 RX ADMIN — LIDOCAINE 1 APPLICATION(S): 4 CREAM TOPICAL at 04:47

## 2021-01-19 RX ADMIN — POLYETHYLENE GLYCOL 3350 17 GRAM(S): 17 POWDER, FOR SOLUTION ORAL at 12:26

## 2021-01-19 RX ADMIN — Medication 10 MILLIGRAM(S): at 21:12

## 2021-01-19 RX ADMIN — HEPARIN SODIUM 5000 UNIT(S): 5000 INJECTION INTRAVENOUS; SUBCUTANEOUS at 21:12

## 2021-01-19 RX ADMIN — Medication 2.5 MILLIGRAM(S): at 04:47

## 2021-01-19 RX ADMIN — OXYCODONE HYDROCHLORIDE 10 MILLIGRAM(S): 5 TABLET ORAL at 10:51

## 2021-01-19 RX ADMIN — Medication 2.5 MILLIGRAM(S): at 17:01

## 2021-01-19 RX ADMIN — OXYCODONE HYDROCHLORIDE 10 MILLIGRAM(S): 5 TABLET ORAL at 18:42

## 2021-01-19 RX ADMIN — CEFTRIAXONE 100 MILLIGRAM(S): 500 INJECTION, POWDER, FOR SOLUTION INTRAMUSCULAR; INTRAVENOUS at 04:47

## 2021-01-19 RX ADMIN — Medication 200 MILLIGRAM(S): at 21:11

## 2021-01-19 RX ADMIN — PANTOPRAZOLE SODIUM 40 MILLIGRAM(S): 20 TABLET, DELAYED RELEASE ORAL at 04:46

## 2021-01-19 RX ADMIN — ONDANSETRON 4 MILLIGRAM(S): 8 TABLET, FILM COATED ORAL at 10:53

## 2021-01-19 RX ADMIN — Medication 975 MILLIGRAM(S): at 14:31

## 2021-01-19 RX ADMIN — HEPARIN SODIUM 5000 UNIT(S): 5000 INJECTION INTRAVENOUS; SUBCUTANEOUS at 04:46

## 2021-01-19 RX ADMIN — HEPARIN SODIUM 5000 UNIT(S): 5000 INJECTION INTRAVENOUS; SUBCUTANEOUS at 14:29

## 2021-01-19 RX ADMIN — SENNA PLUS 2 TABLET(S): 8.6 TABLET ORAL at 21:11

## 2021-01-19 RX ADMIN — PANTOPRAZOLE SODIUM 40 MILLIGRAM(S): 20 TABLET, DELAYED RELEASE ORAL at 18:42

## 2021-01-19 RX ADMIN — ONDANSETRON 4 MILLIGRAM(S): 8 TABLET, FILM COATED ORAL at 02:34

## 2021-01-19 RX ADMIN — Medication 15 MILLIGRAM(S): at 04:01

## 2021-01-19 NOTE — PROGRESS NOTE ADULT - PROBLEM SELECTOR PLAN 1
S/P RALP.  -management as per   -pain control  -Bowel regimen  -OOB and ambulate

## 2021-01-19 NOTE — PROGRESS NOTE ADULT - PROBLEM SELECTOR PLAN 3
(+) GNR in urien from VS  -Cont Ceftriaxone as per   -Check  CBC, CMP in AM
(+) GNR in urien from VS  -Cont Ceftriaxone as per   -F/U ID and sensitivity of urine cx
(+) GNR in urien from VS  -Cont Ceftriaxone as per   -Check  CBC, CMP in AM
(+) GNR in urien from VS  -Cont Ceftriaxone as per   -F/U ID and sensitivity of urine cx

## 2021-01-19 NOTE — PROGRESS NOTE ADULT - PROBLEM SELECTOR PROBLEM 1
Malignant neoplasm of prostate

## 2021-01-19 NOTE — PROGRESS NOTE ADULT - PROBLEM SELECTOR PLAN 4
Improved with baclofen  -Cont. baclofen 5mg PO Q8H PRN for Hiccups
baclofen 5mg PO Q8H PRN for Hiccups

## 2021-01-19 NOTE — PROGRESS NOTE ADULT - PROBLEM SELECTOR PLAN 6
SQ Heparin
Pt expresses having anxiety, wants to speak with someone. Consider psych eval or follow up
SQ Heparin
SQ Heparin

## 2021-01-20 ENCOUNTER — TRANSCRIPTION ENCOUNTER (OUTPATIENT)
Age: 56
End: 2021-01-20

## 2021-01-20 VITALS
DIASTOLIC BLOOD PRESSURE: 60 MMHG | TEMPERATURE: 98 F | OXYGEN SATURATION: 99 % | RESPIRATION RATE: 18 BRPM | SYSTOLIC BLOOD PRESSURE: 99 MMHG | HEART RATE: 77 BPM

## 2021-01-20 LAB
CULTURE RESULTS: SIGNIFICANT CHANGE UP
SPECIMEN SOURCE: SIGNIFICANT CHANGE UP

## 2021-01-20 PROCEDURE — 99231 SBSQ HOSP IP/OBS SF/LOW 25: CPT

## 2021-01-20 RX ORDER — OXYCODONE HYDROCHLORIDE 5 MG/1
1 TABLET ORAL
Qty: 42 | Refills: 0
Start: 2021-01-20 | End: 2021-01-26

## 2021-01-20 RX ORDER — CEPHALEXIN 500 MG
1 CAPSULE ORAL
Qty: 40 | Refills: 0
Start: 2021-01-20 | End: 2021-02-08

## 2021-01-20 RX ORDER — PANTOPRAZOLE SODIUM 20 MG/1
1 TABLET, DELAYED RELEASE ORAL
Qty: 14 | Refills: 0
Start: 2021-01-20 | End: 2021-01-26

## 2021-01-20 RX ORDER — CEFPODOXIME PROXETIL 100 MG
1 TABLET ORAL
Qty: 14 | Refills: 0
Start: 2021-01-20 | End: 2021-01-26

## 2021-01-20 RX ORDER — DIAZEPAM 5 MG
1 TABLET ORAL
Qty: 42 | Refills: 0
Start: 2021-01-20 | End: 2021-02-02

## 2021-01-20 RX ORDER — IBUPROFEN 200 MG
1 TABLET ORAL
Qty: 56 | Refills: 0
Start: 2021-01-20 | End: 2021-02-02

## 2021-01-20 RX ORDER — ACETAMINOPHEN 500 MG
3 TABLET ORAL
Qty: 0 | Refills: 0 | DISCHARGE
Start: 2021-01-20

## 2021-01-20 RX ORDER — DRONABINOL 2.5 MG
1 CAPSULE ORAL
Qty: 28 | Refills: 0
Start: 2021-01-20 | End: 2021-02-02

## 2021-01-20 RX ADMIN — POLYETHYLENE GLYCOL 3350 17 GRAM(S): 17 POWDER, FOR SOLUTION ORAL at 12:10

## 2021-01-20 RX ADMIN — Medication 2.5 MILLIGRAM(S): at 05:49

## 2021-01-20 RX ADMIN — Medication 600 MILLIGRAM(S): at 00:18

## 2021-01-20 RX ADMIN — Medication 200 MILLIGRAM(S): at 05:51

## 2021-01-20 RX ADMIN — Medication 600 MILLIGRAM(S): at 12:17

## 2021-01-20 RX ADMIN — HEPARIN SODIUM 5000 UNIT(S): 5000 INJECTION INTRAVENOUS; SUBCUTANEOUS at 05:51

## 2021-01-20 RX ADMIN — Medication 600 MILLIGRAM(S): at 05:51

## 2021-01-20 RX ADMIN — OXYCODONE HYDROCHLORIDE 10 MILLIGRAM(S): 5 TABLET ORAL at 05:50

## 2021-01-20 RX ADMIN — PANTOPRAZOLE SODIUM 40 MILLIGRAM(S): 20 TABLET, DELAYED RELEASE ORAL at 05:49

## 2021-01-20 RX ADMIN — OXYCODONE HYDROCHLORIDE 10 MILLIGRAM(S): 5 TABLET ORAL at 12:17

## 2021-01-20 RX ADMIN — HEPARIN SODIUM 5000 UNIT(S): 5000 INJECTION INTRAVENOUS; SUBCUTANEOUS at 12:10

## 2021-01-20 RX ADMIN — Medication 5 MILLIGRAM(S): at 00:18

## 2021-01-20 NOTE — PROGRESS NOTE ADULT - SUBJECTIVE AND OBJECTIVE BOX
No events overnite  Afeb 111/59 55 100%RA    Pt has c/o nausea with dilaudid; stressed about illness; wants to go home  Abd- soft; tender at drain site  Villanueva 1100  HARSHAL (IR) - 20  
No events overnite  Afeb 106/54 61 98%RA    Pt has no new c/o; still has pain, relieved for a short time with pain meds  Abd- soft NT ND; + flatus  Villanueva 200  HARSHAL scant  
Subjective  Tolerating full liquid diet. Last BM few days ago but continues to pass gas. Continues to have intermittent abdominal pain.     Objective    Vital signs  T(F): , Max: 99.7 (01-16-21 @ 13:43)  HR: 72 (01-17-21 @ 02:54)  BP: 123/62 (01-17-21 @ 02:54)  SpO2: 100% (01-17-21 @ 02:54)  Wt(kg): --    Output     01-16 @ 07:01  -  01-17 @ 07:00  --------------------------------------------------------  IN: 1250 mL / OUT: 1945 mL / NET: -695 mL        Gen: cachetic appearing, NAD   Abd: moderately tender to palpation. mildly distended  : Villanueva in place draining urine. IR drain in place with urine in bag.     Labs      01-15 @ 16:11    WBC 16.66 / Hct 34.3  / SCr 0.71       
 Note    Post IR Procedure Note    s/p drain placement and fluid aspiration.    Pt seen and examined, reporting improved pain, but requiring Dilaudid and Zofran at this time.    Vital Signs Last 24 Hrs  T(C): 36.3 (15 Jaycob 2021 21:50), Max: 36.4 (15 Jaycob 2021 01:04)  T(F): 97.4 (15 Jaycob 2021 21:50), Max: 97.6 (15 Jaycob 2021 01:04)  HR: 69 (15 Jaycob 2021 21:50) (69 - 95)  BP: 124/69 (15 Jaycob 2021 21:50) (124/69 - 144/71)  BP(mean): --  RR: 18 (15 Jaycob 2021 21:50) (16 - 18)  SpO2: 98% (15 Jaycob 2021 21:50) (98% - 100%)    I&O's Summary    14 Jan 2021 07:01  -  15 Jaycob 2021 07:00  --------------------------------------------------------  IN: 1200 mL / OUT: 1750 mL / NET: -550 mL    15 Jaycob 2021 07:01  -  15 Jaycob 2021 23:12  --------------------------------------------------------  IN: 900 mL / OUT: 2650 mL / NET: -1750 mL    PHYSICAL EXAM:   Constitutional: no distress    Respiratory: clear    Cardiovascular: regular    Gastrointestinal: minimal distention, firm, minimal tenderness    Genitourinary: right sided drain in place, draining well, efraín colored fluid.  Villanueva in place, draining well, clear yellow urine.     Extremities: venodynes in place.     LABS:                    11.5   16.66 )-----------( 333      ( 15 Jaycob 2021 16:11 )             34.3       01-15    132<L>  |  98  |  23  ----------------------------<  89  4.0   |  22  |  0.71    Ca    8.7      15 Jaycob 2021 16:11  Phos  2.2     01-15  Mg     2.3     01-15    TPro  7.0  /  Alb  2.7<L>  /  TBili  0.4  /  DBili  x   /  AST  11  /  ALT  7   /  AlkPhos  72  01-15
Overnight events:  None    Subjective:  Pt c/o continued abdominal pain and nausea    Objective:    Vital signs  T(C): , Max: 37.2 (01-14-21 @ 20:17)  HR: 81 (01-15-21 @ 06:04)  BP: 142/81 (01-15-21 @ 06:04)  SpO2: 100% (01-15-21 @ 06:04)  Wt(kg): --    Output   Villanueva: 1750 yellow w/ slight mucus  01-14 @ 07:01  -  01-15 @ 07:00  --------------------------------------------------------  IN: 1200 mL / OUT: 1750 mL / NET: -550 mL        Gen: NAD  Abd: incisions c/d/i, moderately distended with lower abdominal pain  : brandin secured    Labs                        11.9   20.15 )-----------( 322      ( 15 Jaycob 2021 01:45 )             33.6     15 Jaycob 2021 01:45    131    |  99     |  44     ----------------------------<  116    4.3     |  20     |  1.37     Ca    8.9        15 Jaycob 2021 01:45        Urine Cx: p  Blood Cx: p    Imaging
Overnight events:  Pt underwent IR drainage of pelvic collection    Subjective:  Pt c/o continued nausea and hiccups, no vomiting    Objective:    Vital signs  T(C): , Max: 36.8 (01-16-21 @ 05:15)  HR: 68 (01-16-21 @ 05:15)  BP: 117/67 (01-16-21 @ 05:15)  SpO2: 96% (01-16-21 @ 05:15)  Wt(kg): --    Output   Ghotra: 1200  Drain: 300 gold color  01-15 @ 07:01  -  01-16 @ 07:00  --------------------------------------------------------  IN: 1750 mL / OUT: 3500 mL / NET: -1750 mL        Gen: cachetic. NAD  Abd: incisions c/d/i, mildly distended with suprapubic tenderness, drain dressing c/d/i  : ghotra secured    Labs: none today                        11.5   16.66 )-----------( 333      ( 15 Jaycob 2021 16:11 )             34.3     15 Jaycob 2021 16:11    132    |  98     |  23     ----------------------------<  89     4.0     |  22     |  0.71     Ca    8.7        15 Jaycob 2021 16:11  Phos  2.2       15 Jaycob 2021 16:11  Mg     2.3       15 Jaycob 2021 16:11        Urine Cx: pending  Blood Cx: NGTD  IR Cx: pending    Imaging:  CT Abdomen and Pelvis Cystogram w/ Catheter (01.15.21 @ 14:41) >  FINDINGS:    BLADDER: Ghotra catheter. Extravasation of contrast from the bladder into the extraperitoneal space and within the peritoneal cavity.  REPRODUCTIVE ORGANS: Prostatectomy  LYMPH NODES: No pelvic lymphadenopathy.    VISUALIZED PORTIONS:    ABDOMINAL ORGANS: Within normal limits.  BOWEL: No bowel obstruction.  PERITONEUM: Moderate amount of free fluid is noted throughout the abdomen.  VESSELS: Atherosclerotic calcification.  ABDOMINAL WALL: Postsurgical change.  BONES: Scattered sclerotic foci in the pelvis possibly bony islands; reference right 9 mm focus (4:53)..    IMPRESSION: Bladder rupture with intraperitoneal and extraperitoneal components.    
Overnight events:  None    Subjective:  Pt c/o decreased appetite and continued abdominal discomfort, no vomiting, + flatus, no BM today    Objective:    Vital signs  T(C): , Max: 37.6 (01-17-21 @ 22:01)  HR: 60 (01-18-21 @ 03:54)  BP: 107/59 (01-18-21 @ 03:54)  SpO2: 99% (01-18-21 @ 03:54)  Wt(kg): --    Output   Ghotra: 950  HARSHAL: 50  01-17 @ 07:01  -  01-18 @ 07:00  --------------------------------------------------------  IN: 0 mL / OUT: 1900 mL / NET: -1900 mL        Gen: cachectic  Abd: tender suprapubic area, drain dressing c/d/i  : ghotra secured    Labs: none        Drain Cx:   Culture - Other (01.15.21 @ 23:16)    -  Piperacillin/Tazobactam: S <=8    -  Tobramycin: S <=2    -  Trimethoprim/Sulfamethoxazole: S <=0.5/9.5    -  Amikacin: S <=16    -  Amoxicillin/Clavulanic Acid: S <=8/4    -  Ampicillin: R >16 These ampicillin results predict results for amoxicillin    -  Ampicillin/Sulbactam: R >16/8 Enterobacter, Citrobacter, and Serratia may develop resistance during prolonged therapy (3-4 days)    -  Aztreonam: S <=4    -  Cefazolin: S <=2 Enterobacter, Citrobacter, and Serratia may develop resistance during prolonged therapy (3-4 days)    -  Cefepime: S <=2    -  Cefoxitin: S <=8    -  Ceftriaxone: S <=1 Enterobacter, Citrobacter, and Serratia may develop resistance during prolonged therapy    -  Ciprofloxacin: S <=0.25    -  Ertapenem: S <=0.5    -  Gentamicin: S <=2    -  Imipenem: S <=1    -  Levofloxacin: S <=0.5    -  Meropenem: S <=1    Specimen Source: Drainage PELVIC COLLECTION    Culture Results:   Few Klebsiella pneumoniae    Organism Identification: Klebsiella pneumoniae    Organism: Klebsiella pneumoniae    Method Type: JAMES  Culture - Urine (01.15.21 @ 04:46)    -  Trimethoprim/Sulfamethoxazole: S <=0.5/9.5    -  Trimethoprim/Sulfamethoxazole: S <=0.5/9.5    -  Levofloxacin: S <=0.5    -  Levofloxacin: S <=0.5    -  Tigecycline: S <=2    -  Tigecycline: S <=2    -  Tobramycin: S <=2    -  Tobramycin: S <=2    -  Aztreonam: S <=4    -  Aztreonam: S <=4    -  Amoxicillin/Clavulanic Acid: S <=8/4    -  Amoxicillin/Clavulanic Acid: S <=8/4    -  Ampicillin/Sulbactam: R >16/8 Enterobacter, Citrobacter, and Serratia may develop resistance during prolonged therapy (3-4 days)    -  Ampicillin/Sulbactam: R >16/8 Enterobacter, Citrobacter, and Serratia may develop resistance during prolonged therapy (3-4 days)    -  Ampicillin: R >16 These ampicillin results predict results for amoxicillin    -  Ampicillin: R >16 These ampicillin results predict results for amoxicillin    -  Amikacin: S <=16    -  Amikacin: S <=16    -  Meropenem: S <=1    -  Meropenem: S <=1    -  Nitrofurantoin: I 64 Should not be used to treat pyelonephritis    -  Nitrofurantoin: S <=32 Should not be used to treat pyelonephritis    -  Piperacillin/Tazobactam: S 16    -  Piperacillin/Tazobactam: S <=8    -  Imipenem: S <=1    -  Imipenem: S <=1    -  Ciprofloxacin: S <=0.25    -  Ciprofloxacin: S <=0.25    -  Gentamicin: S <=2    -  Gentamicin: S <=2    -  Ertapenem: S <=0.5    -  Ertapenem: S <=0.5    -  Ceftriaxone: S <=1 Enterobacter, Citrobacter, and Serratia may develop resistance during prolonged therapy    -  Ceftriaxone: S <=1 Enterobacter, Citrobacter, and Serratia may develop resistance during prolonged therapy    -  Cefepime: S <=2    -  Cefepime: S <=2    -  Cefazolin: S <=2 (MIC_CL_COM_ENTERIC_CEFAZU) For uncomplicated UTI with K. pneumoniae, E. coli, or P. mirablis: JAMES <=16 is sensitive and JAMES >=32 is resistant. This also predicts results for oral agents cefaclor, cefdinir, cefpodoxime, cefprozil, cefuroxime axetil, cephalexin and locarbef for uncomplicated UTI. Note that some isolates may be susceptible to these agents while testing resistant to cefazolin.    -  Cefazolin: S 4 (MIC_CL_COM_ENTERIC_CEFAZU) For uncomplicated UTI with K. pneumoniae, E. coli, or P. mirablis: JAMES <=16 is sensitive and JAMES >=32 is resistant. This also predicts results for oral agents cefaclor, cefdinir, cefpodoxime, cefprozil, cefuroxime axetil, cephalexin and locarbef for uncomplicated UTI. Note that some isolates may be susceptible to these agents while testing resistant to cefazolin.    -  Cefoxitin: S <=8    -  Cefoxitin: S <=8    Specimen Source: .Urine Catheterized    Culture Results:   50,000 - 99,000 CFU/mL Klebsiella pneumoniae  50,000 - 99,000 CFU/mL Klebsiella pneumoniae #2    Organism Identification: Klebsiella pneumoniae  Klebsiella pneumoniae    Organism: Klebsiella pneumoniae    Organism: Klebsiella pneumoniae    Method Type: JAMES    Method Type: JAMES    Blood Cx:   Culture - Blood (01.15.21 @ 06:12)    Specimen Source: .Blood Blood-Peripheral    Culture Results:   No growth to date.        Imaging
Patient is a 55y old  Male who presents with a chief complaint of 54 yo M s/p RALP on 1/4/21.  Pt followed up on 1/11 with Dr. Delgadillo and had successful TOV.  That evening he was experiencing increasing abd pain and went to [facility] where he was seen, pain controlled and discharged.  His abd pain continued to worsen and he developed SOB so he returned to [facility] on 1/13 where he was found to have diffuse abd ascites, ARF (Cr=4.5), and Leukocytosis with a left shift.  He was admitted for further workup.  While hospitalized he was reported to be in urinary retention and  had a ghotra placed with approximately 2 L of urine collected, he then underwent US guided paracentesis with removal of 1500cc of fluid.  Pt transferred to [facility] for further management. (17 Jan 2021 11:00)      SUBJECTIVE / OVERNIGHT EVENTS: No acute events overnight. Pt still complaining of pain. Also say he has anxiety, which he feels getting worse in the hospital     ADDITIONAL REVIEW OF SYSTEMS:    MEDICATIONS  (STANDING):  acetaminophen   Tablet .. 975 milliGRAM(s) Oral every 6 hours  cefpodoxime 200 milliGRAM(s) Oral two times a day  dronabinol 2.5 milliGRAM(s) Oral two times a day before meals  heparin   Injectable 5000 Unit(s) SubCutaneous every 8 hours  ibuprofen  Tablet. 600 milliGRAM(s) Oral every 6 hours  lidocaine 5% Ointment 1 Application(s) Topical two times a day  melatonin 3 milliGRAM(s) Oral at bedtime  pantoprazole    Tablet 40 milliGRAM(s) Oral every 12 hours  polyethylene glycol 3350 17 Gram(s) Oral daily  senna 2 Tablet(s) Oral at bedtime    MEDICATIONS  (PRN):  aluminum hydroxide/magnesium hydroxide/simethicone Suspension 30 milliLiter(s) Oral every 4 hours PRN Dyspepsia  baclofen 5 milliGRAM(s) Oral every 8 hours PRN hiccups  diazepam    Tablet 5 milliGRAM(s) Oral two times a day PRN anxiety/bladder spasm  metoclopramide Injectable 10 milliGRAM(s) IV Push every 6 hours PRN hiccups  ondansetron Injectable 4 milliGRAM(s) IV Push every 6 hours PRN Nausea and/or Vomiting  oxyCODONE    IR 5 milliGRAM(s) Oral every 4 hours PRN mild-mod pain  oxyCODONE    IR 10 milliGRAM(s) Oral every 4 hours PRN Severe Pain (7 - 10)      CAPILLARY BLOOD GLUCOSE        I&O's Summary    18 Jan 2021 07:01  -  19 Jan 2021 07:00  --------------------------------------------------------  IN: 0 mL / OUT: 2160 mL / NET: -2160 mL    19 Jan 2021 07:01  -  19 Jan 2021 12:05  --------------------------------------------------------  IN: 0 mL / OUT: 310 mL / NET: -310 mL        PHYSICAL EXAM:  Vital Signs Last 24 Hrs  T(C): 36.5 (19 Jan 2021 09:00), Max: 37 (18 Jan 2021 12:51)  T(F): 97.7 (19 Jan 2021 09:00), Max: 98.6 (18 Jan 2021 12:51)  HR: 96 (19 Jan 2021 09:00) (55 - 96)  BP: 109/71 (19 Jan 2021 09:00) (106/59 - 113/66)  BP(mean): --  RR: 18 (19 Jan 2021 09:00) (17 - 18)  SpO2: 100% (19 Jan 2021 09:00) (100% - 100%)    PHYSICAL EXAM:  GENERAL: NAD, cachectic   HEAD:  Atraumatic, Normocephalic  EYES: EOMI, PERRLA, conjunctiva and sclera clear  NECK: Supple, No JVD  CHEST/LUNG: Clear to auscultation bilaterally; No wheeze  HEART: Regular rate and rhythm; No murmurs, rubs, or gallops  ABDOMEN: Soft, Nontender, Nondistended; Bowel sounds present  EXTREMITIES:  2+ Peripheral Pulses, No clubbing, cyanosis, or edema  PSYCH: AAOx3, normal affect,   NEUROLOGY: 5/5 strength, normal sensation   SKIN: No rashes or lesions    LABS:                      RADIOLOGY & ADDITIONAL TESTS:  Results Reviewed:   Imaging Personally Reviewed:  Electrocardiogram Personally Reviewed:    COORDINATION OF CARE:  Care Discussed with Consultants/Other Providers [Y/N]:  Prior or Outpatient Records Reviewed [Y/N]:  
Patient is a 55y old  Male who presents with a chief complaint of 54 yo M s/p RALP on 1/4/21.  Pt followed up on 1/11 with Dr. Delgadillo and had successful TOV.  That evening he was experiencing increasing abd pain and went to [facility] where he was seen, pain controlled and discharged.  His abd pain continued to worsen and he developed SOB so he returned to [facility] on 1/13 where he was found to have diffuse abd ascites, ARF (Cr=4.5), and Leukocytosis with a left shift.  He was admitted for further workup.  While hospitalized he was reported to be in urinary retention and  had a ghotra placed with approximately 2 L of urine collected, he then underwent US guided paracentesis with removal of 1500cc of fluid.  Pt transferred to [facility] for further management. (17 Jan 2021 11:00)      SUBJECTIVE / OVERNIGHT EVENTS: Feels better today, PO intake improved     MEDICATIONS  (STANDING):  acetaminophen   Tablet .. 975 milliGRAM(s) Oral every 6 hours  cefTRIAXone   IVPB 1000 milliGRAM(s) IV Intermittent every 24 hours  dronabinol 2.5 milliGRAM(s) Oral two times a day before meals  heparin   Injectable 5000 Unit(s) SubCutaneous every 8 hours  ketorolac   Injectable 15 milliGRAM(s) IV Push every 6 hours  lidocaine 5% Ointment 1 Application(s) Topical two times a day  melatonin 3 milliGRAM(s) Oral at bedtime  pantoprazole    Tablet 40 milliGRAM(s) Oral every 12 hours  polyethylene glycol 3350 17 Gram(s) Oral daily  senna 2 Tablet(s) Oral at bedtime    MEDICATIONS  (PRN):  aluminum hydroxide/magnesium hydroxide/simethicone Suspension 30 milliLiter(s) Oral every 4 hours PRN Dyspepsia  baclofen 5 milliGRAM(s) Oral every 8 hours PRN hiccups  diazepam    Tablet 5 milliGRAM(s) Oral two times a day PRN anxiety  HYDROmorphone   Tablet 2 milliGRAM(s) Oral every 4 hours PRN Severe Pain (7 - 10)  HYDROmorphone   Tablet 1 milliGRAM(s) Oral every 4 hours PRN Moderate Pain (4 - 6)  metoclopramide Injectable 10 milliGRAM(s) IV Push every 6 hours PRN hiccups  ondansetron Injectable 4 milliGRAM(s) IV Push every 6 hours PRN Nausea and/or Vomiting      Vital Signs Last 24 Hrs  T(C): 36.3 (18 Jan 2021 03:54), Max: 37.6 (17 Jan 2021 22:01)  T(F): 97.4 (18 Jan 2021 03:54), Max: 99.7 (17 Jan 2021 22:01)  HR: 60 (18 Jan 2021 03:54) (60 - 71)  BP: 107/59 (18 Jan 2021 03:54) (107/59 - 135/73)  BP(mean): --  RR: 18 (18 Jan 2021 03:54) (18 - 18)  SpO2: 99% (18 Jan 2021 03:54) (97% - 99%)  CAPILLARY BLOOD GLUCOSE        I&O's Summary    17 Jan 2021 07:01  -  18 Jan 2021 07:00  --------------------------------------------------------  IN: 0 mL / OUT: 1900 mL / NET: -1900 mL        PHYSICAL EXAM:  GENERAL: NAD, well-developed, cachectic   HEAD:  Atraumatic, Normocephalic  EYES: EOMI, PERRLA, conjunctiva and sclera clear  NECK: Supple, No JVD  CHEST/LUNG: Clear to auscultation bilaterally; No wheeze  HEART: Regular rate and rhythm; No murmurs, rubs, or gallops  ABDOMEN: Soft, Nontender, Nondistended; Bowel sounds present  EXTREMITIES:  2+ Peripheral Pulses, No clubbing, cyanosis, or edema  PSYCH: AAOx3  NEUROLOGY: non-focal  SKIN: No rashes or lesions    LABS:                    RADIOLOGY & ADDITIONAL TESTS:    Imaging Personally Reviewed:    Consultant(s) Notes Reviewed:      Care Discussed with Consultants/Other Providers:
Patient is a 55y old  Male who presents with a chief complaint of Cystogram. (15 Jaycob 2021 15:05)      SUBJECTIVE / OVERNIGHT EVENTS: Pt seen and examined, chart reviewed. C/O hiccups, negatively affect his appetite     MEDICATIONS  (STANDING):  cefTRIAXone   IVPB 1000 milliGRAM(s) IV Intermittent every 24 hours  dextrose 5% + sodium chloride 0.45%. 1000 milliLiter(s) (100 mL/Hr) IV Continuous <Continuous>  heparin   Injectable 5000 Unit(s) SubCutaneous every 8 hours  nicotine - 21 mG/24Hr(s) Patch 1 patch Transdermal daily  pantoprazole    Tablet 40 milliGRAM(s) Oral before breakfast  senna 2 Tablet(s) Oral at bedtime    MEDICATIONS  (PRN):  acetaminophen   Tablet .. 650 milliGRAM(s) Oral every 6 hours PRN Temp greater or equal to 38C (100.4F), Mild Pain (1 - 3)  aluminum hydroxide/magnesium hydroxide/simethicone Suspension 30 milliLiter(s) Oral every 4 hours PRN Dyspepsia  HYDROmorphone   Tablet 1 milliGRAM(s) Oral every 4 hours PRN Moderate Pain (4 - 6)  HYDROmorphone   Tablet 2 milliGRAM(s) Oral every 4 hours PRN Severe Pain (7 - 10)  LORazepam     Tablet 0.5 milliGRAM(s) Oral every 12 hours PRN Anxiety  metoclopramide Injectable 10 milliGRAM(s) IV Push every 6 hours PRN hiccups  ondansetron Injectable 4 milliGRAM(s) IV Push every 6 hours PRN Nausea and/or Vomiting      Vital Signs Last 24 Hrs  T(C): 37.1 (16 Jan 2021 09:12), Max: 37.1 (16 Jan 2021 09:12)  T(F): 98.8 (16 Jan 2021 09:12), Max: 98.8 (16 Jan 2021 09:12)  HR: 63 (16 Jan 2021 09:12) (63 - 78)  BP: 116/68 (16 Jan 2021 09:12) (115/63 - 135/72)  BP(mean): --  RR: 17 (16 Jan 2021 09:12) (16 - 18)  SpO2: 97% (16 Jan 2021 09:12) (96% - 100%)  CAPILLARY BLOOD GLUCOSE        I&O's Summary    15 Jaycob 2021 07:01  -  16 Jan 2021 07:00  --------------------------------------------------------  IN: 1750 mL / OUT: 3500 mL / NET: -1750 mL    16 Jan 2021 07:01  -  16 Jan 2021 10:30  --------------------------------------------------------  IN: 450 mL / OUT: 550 mL / NET: -100 mL        PHYSICAL EXAM:  GENERAL: NAD, well-developed, cachectic   HEAD:  Atraumatic, Normocephalic  EYES: EOMI, PERRLA, conjunctiva and sclera clear  NECK: Supple, No JVD  CHEST/LUNG: Clear to auscultation bilaterally; No wheeze  HEART: Regular rate and rhythm; No murmurs, rubs, or gallops  ABDOMEN: Soft, mildly tender, Nondistended; Bowel sounds present  EXTREMITIES:  2+ Peripheral Pulses, No clubbing, cyanosis, or edema  PSYCH: AAOx3  NEUROLOGY: non-focal  SKIN: No rashes or lesions    LABS:                        11.5   16.66 )-----------( 333      ( 15 Jaycob 2021 16:11 )             34.3     01-15    132<L>  |  98  |  23  ----------------------------<  89  4.0   |  22  |  0.71    Ca    8.7      15 Jaycob 2021 16:11  Phos  2.2     01-15  Mg     2.3     01-15    TPro  7.0  /  Alb  2.7<L>  /  TBili  0.4  /  DBili  x   /  AST  11  /  ALT  7   /  AlkPhos  72  01-15    PT/INR - ( 15 Jaycob 2021 01:45 )   PT: 15.7 sec;   INR: 1.40 ratio         PTT - ( 15 Jaycob 2021 01:45 )  PTT:34.5 sec    blood cx from VS: negative so far  Urine cx: (+) GNR       RADIOLOGY & ADDITIONAL TESTS:    Imaging Personally Reviewed:    Consultant(s) Notes Reviewed:      Care Discussed with Consultants/Other Providers:
Patient is a 55y old  Male who presents with a chief complaint of Cystogram. (15 Jaycob 2021 15:05)      SUBJECTIVE / OVERNIGHT EVENTS: Feels better today, hiccups much improved.     MEDICATIONS  (STANDING):  acetaminophen   Tablet .. 975 milliGRAM(s) Oral every 6 hours  cefTRIAXone   IVPB 1000 milliGRAM(s) IV Intermittent every 24 hours  heparin   Injectable 5000 Unit(s) SubCutaneous every 8 hours  nicotine - 21 mG/24Hr(s) Patch 1 patch Transdermal daily  pantoprazole    Tablet 40 milliGRAM(s) Oral before breakfast  senna 2 Tablet(s) Oral at bedtime    MEDICATIONS  (PRN):  aluminum hydroxide/magnesium hydroxide/simethicone Suspension 30 milliLiter(s) Oral every 4 hours PRN Dyspepsia  baclofen 5 milliGRAM(s) Oral every 8 hours PRN hiccups  diazepam    Tablet 5 milliGRAM(s) Oral two times a day PRN anxiety  HYDROmorphone   Tablet 1 milliGRAM(s) Oral every 4 hours PRN Moderate Pain (4 - 6)  HYDROmorphone   Tablet 2 milliGRAM(s) Oral every 4 hours PRN Severe Pain (7 - 10)  ketorolac   Injectable 15 milliGRAM(s) IV Push every 6 hours PRN Mild Pain (1 - 3)  metoclopramide Injectable 10 milliGRAM(s) IV Push every 6 hours PRN hiccups  ondansetron Injectable 4 milliGRAM(s) IV Push every 6 hours PRN Nausea and/or Vomiting      Vital Signs Last 24 Hrs  T(C): 37 (17 Jan 2021 10:00), Max: 37.6 (16 Jan 2021 13:43)  T(F): 98.6 (17 Jan 2021 10:00), Max: 99.7 (16 Jan 2021 13:43)  HR: 67 (17 Jan 2021 10:00) (67 - 72)  BP: 135/73 (17 Jan 2021 10:00) (119/65 - 135/73)  BP(mean): --  RR: 18 (17 Jan 2021 10:00) (18 - 18)  SpO2: 97% (17 Jan 2021 10:00) (96% - 100%)  CAPILLARY BLOOD GLUCOSE        I&O's Summary    16 Jan 2021 07:01  -  17 Jan 2021 07:00  --------------------------------------------------------  IN: 1350 mL / OUT: 2975 mL / NET: -1625 mL        PHYSICAL EXAM:  GENERAL: NAD, well-developed, cachectic   HEAD:  Atraumatic, Normocephalic  EYES: EOMI, PERRLA, conjunctiva and sclera clear  NECK: Supple, No JVD  CHEST/LUNG: Clear to auscultation bilaterally; No wheeze  HEART: Regular rate and rhythm; No murmurs, rubs, or gallops  ABDOMEN: Soft, Nontender, Nondistended; Bowel sounds present  EXTREMITIES:  2+ Peripheral Pulses, No clubbing, cyanosis, or edema  PSYCH: AAOx3  NEUROLOGY: non-focal  SKIN: No rashes or lesions    LABS:                        11.5   16.66 )-----------( 333      ( 15 Jaycob 2021 16:11 )             34.3     01-15    132<L>  |  98  |  23  ----------------------------<  89  4.0   |  22  |  0.71    Ca    8.7      15 Jaycob 2021 16:11  Phos  2.2     01-15  Mg     2.3     01-15    TPro  7.0  /  Alb  2.7<L>  /  TBili  0.4  /  DBili  x   /  AST  11  /  ALT  7   /  AlkPhos  72  01-15              RADIOLOGY & ADDITIONAL TESTS:    Imaging Personally Reviewed:    Consultant(s) Notes Reviewed:      Care Discussed with Consultants/Other Providers:

## 2021-01-20 NOTE — DISCHARGE NOTE PROVIDER - CARE PROVIDER_API CALL
Trace Delgadillo)  Urology  [address] [place] [address], [place] [address]  Phone: [phone]  Fax: [phone]  Follow Up Time:

## 2021-01-20 NOTE — DISCHARGE NOTE PROVIDER - NSDCMRMEDTOKEN_GEN_ALL_CORE_FT
acetaminophen 325 mg oral tablet: 3 tab(s) orally every 6 hours  cefpodoxime 200 mg oral tablet: 1 tab(s) orally 2 times a day  diazePAM 5 mg oral tablet: 1 tab(s) orally 3 times a day, As Needed -anxiety/bladder spasm MDD:3  dronabinol 2.5 mg oral capsule: 1 cap(s) orally 2 times a day (before meals) MDD:2  ibuprofen 400 mg oral tablet: 1-2 tab(s) orally every 6 hours, As Needed MDD:4  Keflex 500 mg oral capsule: 1 cap(s) orally every 12 hours   Start this med after you are done with cefpodoxime  Myrbetriq 50 mg oral tablet, extended release: 1 tab(s) orally once a day  oxyCODONE 5 mg oral tablet: 1 tab(s) orally every 4 hours, As Needed MDD:6  pantoprazole 40 mg oral delayed release tablet: 1 tab(s) orally every 12 hours

## 2021-01-20 NOTE — DISCHARGE NOTE PROVIDER - NSDCCPCAREPLAN_GEN_ALL_CORE_FT
PRINCIPAL DISCHARGE DIAGNOSIS  Diagnosis: UTI (urinary tract infection)  Assessment and Plan of Treatment: Drink plenty of fluids  Change dressing at drain site as needed, or after a shower; may leave dressing 2 days if not wet; empty drain as needed and record amount; have amount with you at appointments  Take cefpodoxime 7 days, then start keflex until Dr. Delgadillo removes tubes

## 2021-01-20 NOTE — PROGRESS NOTE ADULT - NUTRITIONAL ASSESSMENT
This patient has been assessed with a concern for Malnutrition and has been determined to have a diagnosis/diagnoses of Severe protein-calorie malnutrition and Underweight (BMI < 19).    This patient is being managed with:   Diet Regular-  Supplement Feeding Modality:  Oral  Ensure Enlive Cans or Servings Per Day:  1       Frequency:  Two Times a day  Ensure Pudding Cans or Servings Per Day:  2       Frequency:  Two Times a day  Entered: Jan 18 2021  7:18AM    

## 2021-01-20 NOTE — DISCHARGE NOTE PROVIDER - HOSPITAL COURSE
54 yo M s/p RALP on 1/4/21.  Pt followed up on 1/11 with Dr. Delgadillo and had successful TOV.  That evening he was experiencing increasing abd pain and went to [facility] where he was seen, pain controlled and discharged.  His abd pain continued to worsen and he developed SOB so he returned to [facility] on 1/13 where he was found to have diffuse abd ascites, ARF (Cr=4.5), and Leukocytosis with a left shift.  He was admitted for further workup.  While hospitalized he was reported to be in urinary retention and  had a ghotra placed with approximately 2 L of urine collected, he then underwent US guided paracentesis with removal of 1500cc of fluid.  Pt transferred to [facility] for further management.      1/15: pt c/o continued abdominal pain, nausea, no BM or flatus since 1/9 1/16: pt underwent placement of IR drain on 1/15, feels slightly better, still w/ nausea, hiccups and abdominal pain, no flatus/BM  1/17: Pain continues to be an issue for him. Had a BM per his account after IR drain placement but not since, passing flatus. Tolerating FLD.    1/18: pt still c/o abdominal pain and decreased appetite on regular diet w/ supplementation, hiccups resolved, c/o insomnia, minimal ambulation, cultures w/ klebsiella  1/19: no new events; nauseous with dilaudid; ok with oxy and motrin  1/20 - no events; home today  Plan:  - home today with brandin and HARSHAL

## 2021-01-20 NOTE — PROGRESS NOTE ADULT - ASSESSMENT
54 yo M prostate cancer, retention of urine requiring ghotra, been having lower abdominal and pelvic pain s/p recent robotic assisted prostatectomy 1/4/21 now transferred from [facility] with ARF, pelvic ascites, and Leukocytosis, r/o Sepsis with plan for CT cystogram. 
54 yo M s/p RALP on 1/4/21.  Pt followed up on 1/11 with Dr. Delgadillo and had successful TOV.  That evening he was experiencing increasing abd pain and went to [facility] where he was seen, pain controlled and discharged.  His abd pain continued to worsen and he developed SOB so he returned to [facility] on 1/13 where he was found to have diffuse abd ascites, ARF (Cr=4.5), and Leukocytosis with a left shift.  He was admitted for further workup.  While hospitalized he was reported to be in urinary retention and  had a ghotra placed with approximately 2 L of urine collected, he then underwent US guided paracentesis with removal of 1500cc of fluid.  Pt transferred to [facility] for further management.      1/15: pt c/o continued abdominal pain, nausea, no BM or flatus since 1/9 1/16: pt underwent placement of IR drain on 1/15, feels slightly better, still w/ nausea, hiccups and abdominal pain, no flatus/BM  1/17: Pain continues to be an issue for him. Had a BM per his account after IR drain placement but not since, passing flatus. Tolerating FLD.    1/18: pt still c/o abdominal pain and decreased appetite on regular diet w/ supplementation, hiccups resolved, c/o insomnia, minimal ambulation, cultures w/ klebsiella    -continue reg diet w/ supplements, monitor GI function  -consider marinol  -continue toradol/tylenol for pain   -continue drain and ghotra  -IVL  -continue CTX  -add melatonin HS  -f/u cultures from [facility]  -hospitalist co-management  -f/u PT  -DVT prophy, IS, OOB, ambulate  
54 yo M s/p RALP on 1/4/21.  Pt followed up on 1/11 with Dr. Delgadillo and had successful TOV.  That evening he was experiencing increasing abd pain and went to [facility] where he was seen, pain controlled and discharged.  His abd pain continued to worsen and he developed SOB so he returned to [facility] on 1/13 where he was found to have diffuse abd ascites, ARF (Cr=4.5), and Leukocytosis with a left shift.  He was admitted for further workup.  While hospitalized he was reported to be in urinary retention and  had a ghotra placed with approximately 2 L of urine collected, he then underwent US guided paracentesis with removal of 1500cc of fluid.  Pt transferred to [facility] for further management.      1/15: pt c/o continued abdominal pain, nausea, no BM or flatus since 1/9 1/16: pt underwent placement of IR drain on 1/15, feels slightly better, still w/ nausea, hiccups and abdominal pain, no flatus/BM    -advance to full liquid diet, monitor GI function  -add reglan  -continue drain and ghotra  -IVF  -continue CTX  -f/u cultures  -hospitalist co-management  -DVT prophy, IS, OOB, ambulate  
56 yo M s/p RALP on 1/4/21.  Pt followed up on 1/11 with Dr. Delgadillo and had successful TOV.  That evening he was experiencing increasing abd pain and went to [facility] where he was seen, pain controlled and discharged.  His abd pain continued to worsen and he developed SOB so he returned to [facility] on 1/13 where he was found to have diffuse abd ascites, ARF (Cr=4.5), and Leukocytosis with a left shift.  He was admitted for further workup.  While hospitalized he was reported to be in urinary retention and  had a ghotra placed with approximately 2 L of urine collected, he then underwent US guided paracentesis with removal of 1500cc of fluid.  Pt transferred to [facility] for further management.      1/15: pt c/o continued abdominal pain, nausea, no BM or flatus since 1/9    -f/u CT urogram  -continue NPO for now  -IVF  -continue CTX  -f/u cultures  -hospitalist co-management  -DVT prophy, IS, OOB, ambulate  
55 year old male s/p RALP 1/4, with anastomotic leak requiring IR assisted drain placement, stable and pain improving.     -Strict I&O's  -Analgesia prn  -Antiemetics prn  -DVT prophylaxis  -Incentive spirometry  -Clears  -OOB  
56 yo M s/p RALP on 1/4/21.  Pt followed up on 1/11 with Dr. Delgadillo and had successful TOV.  That evening he was experiencing increasing abd pain and went to [facility] where he was seen, pain controlled and discharged.  His abd pain continued to worsen and he developed SOB so he returned to [facility] on 1/13 where he was found to have diffuse abd ascites, ARF (Cr=4.5), and Leukocytosis with a left shift.  He was admitted for further workup.  While hospitalized he was reported to be in urinary retention and  had a ghotra placed with approximately 2 L of urine collected, he then underwent US guided paracentesis with removal of 1500cc of fluid.  Pt transferred to [facility] for further management.      1/15: pt c/o continued abdominal pain, nausea, no BM or flatus since 1/9 1/16: pt underwent placement of IR drain on 1/15, feels slightly better, still w/ nausea, hiccups and abdominal pain, no flatus/BM  1/17: Pain continues to be an issue for him. Had a BM per his account after IR drain placement but not since, passing flatus. Tolerating FLD.    1/18: pt still c/o abdominal pain and decreased appetite on regular diet w/ supplementation, hiccups resolved, c/o insomnia, minimal ambulation, cultures w/ klebsiella  1/19: no new events; nauseous with dilaudid  Plan:  - d/c dilaudid  -continue reg diet w/ supplements, monitor GI function  -continue toradol/tylenol for pain   -continue drain and ghotra  -IVL  -continue CTX  -add melatonin HS  -f/u cultures from [facility]  -Eleanor Slater Hospitalist co-management  -f/u PT  -DVT prophy, IS, OOB, ambulate  
54 yo M s/p RALP on 1/4/21.  Pt followed up on 1/11 with Dr. Delgadillo and had successful TOV.  That evening he was experiencing increasing abd pain and went to [facility] where he was seen, pain controlled and discharged.  His abd pain continued to worsen and he developed SOB so he returned to [facility] on 1/13 where he was found to have diffuse abd ascites, ARF (Cr=4.5), and Leukocytosis with a left shift.  He was admitted for further workup.  While hospitalized he was reported to be in urinary retention and  had a ghotra placed with approximately 2 L of urine collected, he then underwent US guided paracentesis with removal of 1500cc of fluid.  Pt transferred to [facility] for further management.      1/15: pt c/o continued abdominal pain, nausea, no BM or flatus since 1/9 1/16: pt underwent placement of IR drain on 1/15, feels slightly better, still w/ nausea, hiccups and abdominal pain, no flatus/BM  1/17: Pain continues to be an issue for him. Had a BM per his account after IR drain placement but not since, passing flatus. Tolerating FLD.    1/18: pt still c/o abdominal pain and decreased appetite on regular diet w/ supplementation, hiccups resolved, c/o insomnia, minimal ambulation, cultures w/ klebsiella  1/19: no new events; nauseous with dilaudid; ok with oxy and motrin  1/20 - no events; home today  Plan:  - home today with brandin and HARSHAL  
54 yo M s/p RALP on 1/4/21.  Pt followed up on 1/11 with Dr. Delgadillo and had successful TOV.  That evening he was experiencing increasing abd pain and went to [facility] where he was seen, pain controlled and discharged.  His abd pain continued to worsen and he developed SOB so he returned to [facility] on 1/13 where he was found to have diffuse abd ascites, ARF (Cr=4.5), and Leukocytosis with a left shift.  He was admitted for further workup.  While hospitalized he was reported to be in urinary retention and  had a ghotra placed with approximately 2 L of urine collected, he then underwent US guided paracentesis with removal of 1500cc of fluid.  Pt transferred to [facility] for further management.      1/15: pt c/o continued abdominal pain, nausea, no BM or flatus since 1/9 1/16: pt underwent placement of IR drain on 1/15, feels slightly better, still w/ nausea, hiccups and abdominal pain, no flatus/BM  1/17: Pain continues to be an issue for him. Had a BM per his account after IR drain placement but not since, passing flatus. Tolerating FLD.      -advance to full liquid diet, monitor GI function  -scheduled toradol, tylenol for pain   -continue drain and ghotra  -IVF  -continue CTX  -f/u cultures  -hospitalist co-management  -DVT prophy, IS, OOB, ambulate  
A 55M prostate cancer, retention of urine requiring ghotra, been having lower abdominal and pelvic pain s/p recent robotic assisted prostatectomy 1/4/21 now transferred from [facility] with ARF, pelvic ascites, and Leukocytosis, r/o Sepsis with plan for CT cystogram. 
A 55M prostate cancer, retention of urine requiring ghotra, been having lower abdominal and pelvic pain s/p recent robotic assisted prostatectomy 1/4/21 now transferred from [facility] with ARF, pelvic ascites, and Leukocytosis, r/o Sepsis with plan for CT cystogram. 
A 55M prostate cancer, retention of urine requiring ghotra, been having lower abdominal and pelvic pain s/p recent robotic assisted prostatectomy 1/4/21 now transferred from [facility] with ARF, pelvic ascites, and Leukocytosis, r/o Sepsis with plan for CT cystogram.

## 2021-01-20 NOTE — PROGRESS NOTE ADULT - PROVIDER SPECIALTY LIST ADULT
Urology
Hospitalist

## 2021-01-20 NOTE — DISCHARGE NOTE PROVIDER - DETAILS OF MALNUTRITION DIAGNOSIS/DIAGNOSES
This patient has been assessed with a concern for Malnutrition and was treated during this hospitalization for the following Nutrition diagnosis/diagnoses:     -  01/17/2021: Severe protein-calorie malnutrition   -  01/17/2021: Underweight (BMI < 19)   This patient has been assessed with a concern for Malnutrition and was treated during this hospitalization for the following Nutrition diagnosis/diagnoses:     -  01/17/2021: Severe protein-calorie malnutrition   -  01/17/2021: Underweight (BMI < 19)    This patient has been assessed with a concern for Malnutrition and was treated during this hospitalization for the following Nutrition diagnosis/diagnoses:     -  01/17/2021: Severe protein-calorie malnutrition   -  01/17/2021: Underweight (BMI < 19)

## 2021-01-20 NOTE — DISCHARGE NOTE NURSING/CASE MANAGEMENT/SOCIAL WORK - PATIENT PORTAL LINK FT
You can access the FollowMyHealth Patient Portal offered by [facility] by registering at the following website: [email or web address]. By joining [facility]’s FollowMyHealth portal, you will also be able to view your health information using other applications (apps) compatible with our system.

## 2021-01-21 LAB
CULTURE RESULTS: SIGNIFICANT CHANGE UP
ORGANISM # SPEC MICROSCOPIC CNT: SIGNIFICANT CHANGE UP
ORGANISM # SPEC MICROSCOPIC CNT: SIGNIFICANT CHANGE UP
SPECIMEN SOURCE: SIGNIFICANT CHANGE UP

## 2021-01-21 NOTE — PHYSICAL THERAPY INITIAL EVALUATION ADULT - IMPAIRMENTS FOUND, PT EVAL
gait, locomotion, and balance/muscle strength
All other review of systems negative, except as noted in HPI

## 2021-01-25 ENCOUNTER — NON-APPOINTMENT (OUTPATIENT)
Age: 56
End: 2021-01-25

## 2021-01-28 DIAGNOSIS — Z01.818 ENCOUNTER FOR OTHER PREPROCEDURAL EXAMINATION: ICD-10-CM

## 2021-01-28 RX ORDER — TAMSULOSIN HYDROCHLORIDE 0.4 MG/1
0.4 CAPSULE ORAL
Refills: 0 | Status: COMPLETED | COMMUNITY
End: 2021-01-28

## 2021-01-28 RX ORDER — FINASTERIDE 5 MG/1
5 TABLET, FILM COATED ORAL
Refills: 0 | Status: COMPLETED | COMMUNITY
End: 2021-01-28

## 2021-01-30 ENCOUNTER — NON-APPOINTMENT (OUTPATIENT)
Age: 56
End: 2021-01-30

## 2021-02-02 ENCOUNTER — OUTPATIENT (OUTPATIENT)
Dept: OUTPATIENT SERVICES | Facility: HOSPITAL | Age: 56
LOS: 1 days | End: 2021-02-02
Payer: COMMERCIAL

## 2021-02-02 ENCOUNTER — APPOINTMENT (OUTPATIENT)
Dept: CT IMAGING | Facility: CLINIC | Age: 56
End: 2021-02-02
Payer: COMMERCIAL

## 2021-02-02 DIAGNOSIS — Z90.79 ACQUIRED ABSENCE OF OTHER GENITAL ORGAN(S): Chronic | ICD-10-CM

## 2021-02-02 DIAGNOSIS — Z98.890 OTHER SPECIFIED POSTPROCEDURAL STATES: Chronic | ICD-10-CM

## 2021-02-02 DIAGNOSIS — C61 MALIGNANT NEOPLASM OF PROSTATE: ICD-10-CM

## 2021-02-02 PROCEDURE — 72192 CT PELVIS W/O DYE: CPT

## 2021-02-02 PROCEDURE — 72192 CT PELVIS W/O DYE: CPT | Mod: 26

## 2021-02-04 ENCOUNTER — APPOINTMENT (OUTPATIENT)
Dept: UROLOGY | Facility: CLINIC | Age: 56
End: 2021-02-04
Payer: COMMERCIAL

## 2021-02-04 DIAGNOSIS — Z87.898 PERSONAL HISTORY OF OTHER SPECIFIED CONDITIONS: ICD-10-CM

## 2021-02-04 PROCEDURE — 99024 POSTOP FOLLOW-UP VISIT: CPT

## 2021-02-04 RX ORDER — TRAMADOL HYDROCHLORIDE 50 MG/1
50 TABLET, COATED ORAL EVERY 8 HOURS
Qty: 15 | Refills: 0 | Status: COMPLETED | COMMUNITY
Start: 2021-01-12 | End: 2021-02-04

## 2021-02-04 NOTE — PHYSICAL EXAM
[General Appearance - Well Developed] : well developed [General Appearance - Well Nourished] : well nourished [Normal Appearance] : normal appearance [Well Groomed] : well groomed [General Appearance - In No Acute Distress] : no acute distress [Abdomen Soft] : soft [Abdomen Tenderness] : non-tender [FreeTextEntry1] : Incision well healed. [Costovertebral Angle Tenderness] : no ~M costovertebral angle tenderness

## 2021-02-04 NOTE — HISTORY OF PRESENT ILLNESS
[FreeTextEntry1] : S/p Robotic RP, January 4th 2021\par Pathology: [place] 6, pT2Nx, neg margins\par \par Was readmitted for urine leak secondary to rupture of the vesicourethral anastomosis.  Underwent placement of drain by interventional radiology with resolution of his symptoms.  He has been home with Villanueva catheter and IR drain to gravity drainage.\par \par He has complaints today of lower extremity edema.  He denies abdominal pain or flank pain.  He is tolerating regular diet having bowel movements, but remains constipated.\par \par Villanueva catheter remains to gravity drainage with clear yellow urine.

## 2021-02-04 NOTE — ASSESSMENT
[FreeTextEntry1] : He underwent CT urogram 2/2/2021.  Images were reviewed, findings show no evidence of further leak from the vesicourethral anastomosis.  The IR drain is in place however there is a collection of fluid that is not being drained.  No other significant findings.\par \par Reviewed with patient.  Recommend interventional radiology for drain repositioning to drain the residual fluid.  Following the procedure he will return to the office for catheter removal and void trial.

## 2021-02-09 LAB
ALBUMIN SERPL ELPH-MCNC: 3.2 G/DL
ALP BLD-CCNC: 104 U/L
ALT SERPL-CCNC: 9 U/L
ANION GAP SERPL CALC-SCNC: 11 MMOL/L
AST SERPL-CCNC: 14 U/L
BASOPHILS # BLD AUTO: 0.04 K/UL
BASOPHILS NFR BLD AUTO: 0.5 %
BILIRUB SERPL-MCNC: <0.2 MG/DL
BUN SERPL-MCNC: 8 MG/DL
CALCIUM SERPL-MCNC: 9.2 MG/DL
CHLORIDE SERPL-SCNC: 104 MMOL/L
CO2 SERPL-SCNC: 26 MMOL/L
CREAT SERPL-MCNC: 0.78 MG/DL
EOSINOPHIL # BLD AUTO: 0.14 K/UL
EOSINOPHIL NFR BLD AUTO: 1.8 %
GLUCOSE SERPL-MCNC: 90 MG/DL
HCT VFR BLD CALC: 33.3 %
HGB BLD-MCNC: 10.4 G/DL
IMM GRANULOCYTES NFR BLD AUTO: 0.3 %
LYMPHOCYTES # BLD AUTO: 3.43 K/UL
LYMPHOCYTES NFR BLD AUTO: 43.5 %
MAN DIFF?: NORMAL
MCHC RBC-ENTMCNC: 29.4 PG
MCHC RBC-ENTMCNC: 31.2 GM/DL
MCV RBC AUTO: 94.1 FL
MONOCYTES # BLD AUTO: 0.64 K/UL
MONOCYTES NFR BLD AUTO: 8.1 %
NEUTROPHILS # BLD AUTO: 3.62 K/UL
NEUTROPHILS NFR BLD AUTO: 45.8 %
PLATELET # BLD AUTO: 597 K/UL
POTASSIUM SERPL-SCNC: 5.1 MMOL/L
PROT SERPL-MCNC: 7.3 G/DL
RBC # BLD: 3.54 M/UL
RBC # FLD: 15.6 %
SODIUM SERPL-SCNC: 141 MMOL/L
WBC # FLD AUTO: 7.89 K/UL

## 2021-02-12 ENCOUNTER — RESULT REVIEW (OUTPATIENT)
Age: 56
End: 2021-02-12

## 2021-02-12 ENCOUNTER — OUTPATIENT (OUTPATIENT)
Dept: OUTPATIENT SERVICES | Facility: HOSPITAL | Age: 56
LOS: 1 days | End: 2021-02-12

## 2021-02-12 ENCOUNTER — APPOINTMENT (OUTPATIENT)
Dept: CT IMAGING | Facility: HOSPITAL | Age: 56
End: 2021-02-12

## 2021-02-12 DIAGNOSIS — R18.8 OTHER ASCITES: ICD-10-CM

## 2021-02-12 DIAGNOSIS — Z98.890 OTHER SPECIFIED POSTPROCEDURAL STATES: Chronic | ICD-10-CM

## 2021-02-12 DIAGNOSIS — Z90.79 ACQUIRED ABSENCE OF OTHER GENITAL ORGAN(S): Chronic | ICD-10-CM

## 2021-02-13 LAB
CULTURE RESULTS: SIGNIFICANT CHANGE UP
SPECIMEN SOURCE: SIGNIFICANT CHANGE UP

## 2021-02-16 ENCOUNTER — APPOINTMENT (OUTPATIENT)
Dept: UROLOGY | Facility: CLINIC | Age: 56
End: 2021-02-16
Payer: COMMERCIAL

## 2021-02-16 VITALS — HEART RATE: 102 BPM | SYSTOLIC BLOOD PRESSURE: 145 MMHG | DIASTOLIC BLOOD PRESSURE: 81 MMHG

## 2021-02-16 PROCEDURE — 99024 POSTOP FOLLOW-UP VISIT: CPT

## 2021-02-23 ENCOUNTER — RESULT REVIEW (OUTPATIENT)
Age: 56
End: 2021-02-23

## 2021-02-23 ENCOUNTER — OUTPATIENT (OUTPATIENT)
Dept: OUTPATIENT SERVICES | Facility: HOSPITAL | Age: 56
LOS: 1 days | End: 2021-02-23
Payer: COMMERCIAL

## 2021-02-23 DIAGNOSIS — R18.8 OTHER ASCITES: ICD-10-CM

## 2021-02-23 DIAGNOSIS — Z98.890 OTHER SPECIFIED POSTPROCEDURAL STATES: Chronic | ICD-10-CM

## 2021-02-23 DIAGNOSIS — Z90.79 ACQUIRED ABSENCE OF OTHER GENITAL ORGAN(S): Chronic | ICD-10-CM

## 2021-02-23 PROCEDURE — 49423 EXCHANGE DRAINAGE CATHETER: CPT

## 2021-02-23 PROCEDURE — 75984 XRAY CONTROL CATHETER CHANGE: CPT | Mod: 26

## 2021-02-24 NOTE — ASSESSMENT
[FreeTextEntry1] : We will arrange for repeat visit with interventional radiology for drain study and reposition drain.  He will return to the office following his drain study and have his catheter removed.  Today his Villanueva catheter was exchanged given the length of time of time its been in.

## 2021-02-24 NOTE — HISTORY OF PRESENT ILLNESS
[FreeTextEntry1] : S/p Robotic RP, January 4th 2021\par Pathology: [place] 6, pT2Nx, neg margins\par \par Was readmitted for urine leak secondary to rupture of the vesicourethral anastomosis.  Underwent placement of drain by interventional radiology with resolution of his symptoms.  He has been home with Villanueva catheter and IR drain to gravity drainage.\par \par Here today for follow-up visit.  Plan was to remove the Villanueva catheter however the patient wishes to have his IR drain study prior to cath removal.\par \par Otherwise feels well.  There is been minimal drainage from his IR drain.  His lower extremity swelling has resolved.

## 2021-02-26 DIAGNOSIS — T81.43XD INFECTION FOLLOWING A PROCEDURE, ORGAN AND SPACE SURGICAL SITE, SUBSEQUENT ENCOUNTER: ICD-10-CM

## 2021-02-26 DIAGNOSIS — Z46.82 ENCOUNTER FOR FITTING AND ADJUSTMENT OF NON-VASCULAR CATHETER: ICD-10-CM

## 2021-03-02 ENCOUNTER — RESULT REVIEW (OUTPATIENT)
Age: 56
End: 2021-03-02

## 2021-03-02 ENCOUNTER — OUTPATIENT (OUTPATIENT)
Dept: OUTPATIENT SERVICES | Facility: HOSPITAL | Age: 56
LOS: 1 days | End: 2021-03-02
Payer: COMMERCIAL

## 2021-03-02 ENCOUNTER — APPOINTMENT (OUTPATIENT)
Dept: CT IMAGING | Facility: HOSPITAL | Age: 56
End: 2021-03-02

## 2021-03-02 DIAGNOSIS — R18.8 OTHER ASCITES: ICD-10-CM

## 2021-03-02 DIAGNOSIS — Z98.890 OTHER SPECIFIED POSTPROCEDURAL STATES: Chronic | ICD-10-CM

## 2021-03-02 DIAGNOSIS — Z90.79 ACQUIRED ABSENCE OF OTHER GENITAL ORGAN(S): Chronic | ICD-10-CM

## 2021-03-02 PROCEDURE — 76080 X-RAY EXAM OF FISTULA: CPT | Mod: 26

## 2021-03-02 PROCEDURE — 74150 CT ABDOMEN W/O CONTRAST: CPT | Mod: 26

## 2021-03-02 PROCEDURE — 49424 ASSESS CYST CONTRAST INJECT: CPT

## 2021-03-03 ENCOUNTER — APPOINTMENT (OUTPATIENT)
Dept: UROLOGY | Facility: CLINIC | Age: 56
End: 2021-03-03

## 2021-03-03 ENCOUNTER — APPOINTMENT (OUTPATIENT)
Dept: UROLOGY | Facility: CLINIC | Age: 56
End: 2021-03-03
Payer: COMMERCIAL

## 2021-03-03 ENCOUNTER — OUTPATIENT (OUTPATIENT)
Dept: OUTPATIENT SERVICES | Facility: HOSPITAL | Age: 56
LOS: 1 days | End: 2021-03-03
Payer: COMMERCIAL

## 2021-03-03 DIAGNOSIS — Z98.890 OTHER SPECIFIED POSTPROCEDURAL STATES: Chronic | ICD-10-CM

## 2021-03-03 DIAGNOSIS — R35.0 FREQUENCY OF MICTURITION: ICD-10-CM

## 2021-03-03 DIAGNOSIS — C61 MALIGNANT NEOPLASM OF PROSTATE: ICD-10-CM

## 2021-03-03 DIAGNOSIS — Z90.79 ACQUIRED ABSENCE OF OTHER GENITAL ORGAN(S): Chronic | ICD-10-CM

## 2021-03-03 PROCEDURE — 51700 IRRIGATION OF BLADDER: CPT | Mod: 58

## 2021-03-03 PROCEDURE — 51700 IRRIGATION OF BLADDER: CPT

## 2021-03-09 DIAGNOSIS — Z46.82 ENCOUNTER FOR FITTING AND ADJUSTMENT OF NON-VASCULAR CATHETER: ICD-10-CM

## 2021-03-24 ENCOUNTER — APPOINTMENT (OUTPATIENT)
Dept: UROLOGY | Facility: CLINIC | Age: 56
End: 2021-03-24

## 2021-03-24 ENCOUNTER — APPOINTMENT (OUTPATIENT)
Dept: UROLOGY | Facility: CLINIC | Age: 56
End: 2021-03-24
Payer: COMMERCIAL

## 2021-03-24 DIAGNOSIS — N39.3 STRESS INCONTINENCE (FEMALE) (MALE): ICD-10-CM

## 2021-03-24 LAB — SARS-COV-2 N GENE NPH QL NAA+PROBE: NOT DETECTED

## 2021-03-24 PROCEDURE — 99024 POSTOP FOLLOW-UP VISIT: CPT

## 2021-03-24 RX ORDER — SULFAMETHOXAZOLE AND TRIMETHOPRIM 400; 80 MG/1; MG/1
400-80 TABLET ORAL DAILY
Qty: 30 | Refills: 2 | Status: COMPLETED | COMMUNITY
Start: 2021-02-16 | End: 2021-03-24

## 2021-03-24 RX ORDER — IBUPROFEN 800 MG/1
800 TABLET, FILM COATED ORAL EVERY 8 HOURS
Qty: 15 | Refills: 0 | Status: COMPLETED | COMMUNITY
Start: 2020-12-18 | End: 2021-03-24

## 2021-03-24 RX ORDER — DRONABINOL 2.5 MG/1
2.5 CAPSULE ORAL TWICE DAILY
Qty: 60 | Refills: 0 | Status: COMPLETED | COMMUNITY
Start: 2021-02-04 | End: 2021-03-24

## 2021-03-24 NOTE — HISTORY OF PRESENT ILLNESS
[FreeTextEntry1] : S/p Robotic RP, January 4th 2021\par Pathology: [place] 6, pT2Nx, neg margins\par \par Was readmitted for urine leak secondary to rupture of the vesicourethral anastomosis.  Underwent placement of drain by interventional radiology with resolution of his symptoms.  \par \par He is here today for follow up for his prostate cancer. Reports that urinary flow has been good. Occasional leakage. He has spasms at the beginning of his stream. Using Ibuprofen and Benadryl to help with spasms during the day. At night he has to use Diazepam 5 mg, to help with spasms and frequent urination

## 2021-03-24 NOTE — ASSESSMENT
[FreeTextEntry1] : Patient voiding well.  Postvoid residual was normal.  Minimal stress incontinence however is experiencing intermittent urgency with urge incontinence.  No hematuria or dysuria.  No abdominal pain or flank pain.\par \par Will have a PSA test today.\par Recommend restart mirabegron 25 mg once daily.\par Patient will continue to use nightly diazepam however after 2 weeks on mirabegron, I recommend he stop the diazepam to see how he does.\par \par Follow-up in 1 month

## 2021-03-25 LAB — PSA SERPL-MCNC: <0.01 NG/ML

## 2021-05-10 ENCOUNTER — APPOINTMENT (OUTPATIENT)
Dept: DISASTER EMERGENCY | Facility: OTHER | Age: 56
End: 2021-05-10
Payer: COMMERCIAL

## 2021-05-10 PROCEDURE — 0012A: CPT

## 2021-06-01 ENCOUNTER — NON-APPOINTMENT (OUTPATIENT)
Age: 56
End: 2021-06-01

## 2021-06-08 PROBLEM — N40.1 BPH WITH OBSTRUCTION/LOWER URINARY TRACT SYMPTOMS: Status: RESOLVED | Noted: 2020-12-03 | Resolved: 2021-06-08

## 2021-06-09 ENCOUNTER — APPOINTMENT (OUTPATIENT)
Dept: UROLOGY | Facility: CLINIC | Age: 56
End: 2021-06-09
Payer: COMMERCIAL

## 2021-06-09 DIAGNOSIS — N13.8 BENIGN PROSTATIC HYPERPLASIA WITH LOWER URINARY TRACT SYMPMS: ICD-10-CM

## 2021-06-09 DIAGNOSIS — N40.1 BENIGN PROSTATIC HYPERPLASIA WITH LOWER URINARY TRACT SYMPMS: ICD-10-CM

## 2021-06-09 PROCEDURE — 99213 OFFICE O/P EST LOW 20 MIN: CPT

## 2021-06-09 PROCEDURE — 99072 ADDL SUPL MATRL&STAF TM PHE: CPT

## 2021-06-09 RX ORDER — DIAZEPAM 5 MG/1
5 TABLET ORAL TWICE DAILY
Qty: 30 | Refills: 0 | Status: COMPLETED | COMMUNITY
Start: 2021-02-04 | End: 2021-06-09

## 2021-06-10 LAB — PSA SERPL-MCNC: <0.01 NG/ML

## 2021-06-16 LAB
TESTOST BND SERPL-MCNC: 16.9 PG/ML
TESTOST SERPL-MCNC: 206.2 NG/DL

## 2021-06-21 NOTE — DISEASE MANAGEMENT
[1] : T1 [c] : c [0-10] : 0 -10 ng/mL [3] : 3 [I] : I [Radical Prostatectomy] : Radical Prostatectomy [Patient had a radical prostatectomy] : Patient had a radical prostatectomy  [6] : [place] Score 6 [Negative] : Negative margins [2] : T2 [0] : N0 [X] : MX [] : Patient had no Bone Scan performed [BiopsyDate] : 10/13/2020 [TotalCores] : 12 [TotalPositiveCores] : 1 [MaxCoreInvolvement] : 10% [FreeTextEntry7] : PSA prior to biopsy: 5.44 ng/mL.\par MRI prostate 37 mL volume with intravesical protrusion of median lobe, PIRAD 3 left sided lesion. [RadicalProstatectomyDate] : 1/4/2021 [TotalNumberofnodesresected] : 1 [PositiveNodes] : 1

## 2021-06-21 NOTE — HISTORY OF PRESENT ILLNESS
[FreeTextEntry1] : Here for follow-up visit.  He is now 6 months post radical prostatectomy.\par Urinary function: Mild stress incontinence, stream is good. Having occasional bladder spasms \par \par Erectile function: Occasional fullness at times \par \par Last PSA 3/24/2021: <0.01 ng/mL.\par \par Feels very tired, returned back to work.

## 2021-09-01 NOTE — DISCHARGE NOTE PROVIDER - NSRESEARCHGRANT_OVERRIDEREC_GEN_A_CORE
ONCOLOGY / HEMATOLOGY FOLLOW  UP NOTE:       ONCOLOGY PROBLEMS:  Patient Active Problem List    Diagnosis Date Noted   • Edema 2014     Priority: Medium   • Malignant neoplasm of ovary, left (CMS/HCC) 2013     Priority: Medium     Becki Brown  [id number]  1962    2016: Referred to GYN Oncology by   PCP: Donnie Johnson MD  Med/Onc Dr. Bean Martínez ([phone]) r/t to malignant neoplasm of ovary. Hx of ovarian cancer.    Surgeries:     Biopsies &/or Procedures:     Treatment:  Paclitaxel/Carboplatin: 2013 - 10/31/2013, 5 cycles completed.    GYN/ONC Case Conference:    Surveillance:     Paps:  2011: NEGATIVE FOR INTRAEPITHELIAL LESION OR MALIGNANCY.          2012: Epithelial cell abnormalities:  SQUAMOUS    RARE ATYPICAL SQUAMOUS CELLS OF UNDETERMINED SIGNIFICANCE (ASC-US).    Additional Findings:  ENDOMETRIAL CELLS PRESENT.  2014: NILM  2015: NILM    Imagin2015: CT A/P c CONTRAST - IMPRESSION:  Vague area of decreased attenuation measuring almost 2 cm in the left lobe  of liver. No previous studies are available in our PACS. Patient may need  CT scan of the abdomen pelvis per hepatic protocol when convenient and  comparison with any outside studies.     No bowel obstruction. No mass or fluid collection. No other abnormalities.        Ca-125:   CANCER    Date Value   2016 5 Units/mL   2015 7 Units/mL   2015 9.5 U/mL   10/24/2014 9.7 U/mL   2014 10.3 U/mL     Other labs:    Genetic Counseling:     DNA Testing:       • Vitamin D deficiency 2019     Priority: Low   • Pulmonary edema 2015     Priority: Low     During triathlon training in  hospitalized, no reoccurring issues     • Elevated liver enzymes 2014     Priority: Low   • LBBB (left bundle branch block)      Priority: Low   • Ovarian ca (CMS/HCC)      Priority: Low   • Hypothyroidism      Priority: Low   • Hyperlipidemia      Priority: Low    • Vitiligo      Priority: Low        Chief Complaint   Patient presents with   • Cancer           INTERVAL HISTORY:  Becki Brown was dxed with Stage IB Left Ovarian Cancer in July 2013.  She completed 5 cycles of adjuvant carbo/taxol.     she continues to feel well and denies any fever, chills, night sweats or weight loss.  she also denies any new bone pain.  she has not noticed any increase in fatigue or numbness.  she has been diagnosed with carpal tunnel syndrome on the right.  She is using a brace with good relief.  She does not report any nausea, vomiting, or unusual headaches.  she breathing remains stable with no significant cough or chest pain.  There has been no focal loss of power.  Denies any bleeding or excessive bruising.  Specifically, she denies any hematuria, hematochezia, epistaxis and hemoptysis.  She still has loose stools.      Since her last visit, Becki Brown has had no infections or hospitalizations.       Please see review of system below and the positive findings are highlighted.     PAST MEDICAL HISTORY:    Past Medical History:   Diagnosis Date   • Chemotherapy-induced neuropathy (CMS/HCC) 10/31/2013   • Dyspnea 7/13/2015   • Hyperlipidemia    • Hypothyroidism    • LBBB (left bundle branch block)    • Ovarian ca (CMS/HCC)    • Vitiligo         ALLERGIES:  Patient has no known allergies.     Current Outpatient Medications   Medication Sig   • levothyroxine 75 MCG tablet Take 1 tablet by mouth daily.   • Cholecalciferol (VITAMIN D) 2000 units tablet Take 1 tablet by mouth daily.     No current facility-administered medications for this visit.       FAMILY & SOCIAL HISTORY:  Reviewed in patient's Epic chart and with patient.  Unchanged.     REVIEW OF SYSTEMS:   Elisa Draper MA  9/1/2021  2:22 PM  Sign when Signing Visit  Becki Brown is a 58 year old female here for  Chief Complaint   Patient presents with   • Cancer     Denies latex allergy or sensitivity.    Medication verified,  no changes.  PCP and Pharmacy verified.    Social History     Tobacco Use   Smoking Status Never Smoker   Smokeless Tobacco Never Used     Advance Directives Filed: Yes    ECOG:   ECOG [09/01/21 1417]   ECOG Performance Status 0       Vitals:    Visit Vitals  LMP 06/01/2013       These vital signs are:  Within defined parameters (Per Reference \"Defined Limits Hospital Outpatient Department (HOD)\")    The patient reported abnormal symptoms No, there were no abnormal symptoms to report    Height: No.  Ht Readings from Last 1 Encounters:   07/19/19 5' 3\" (1.6 m)     Weight:Yes, shoes on.  Wt Readings from Last 3 Encounters:   09/02/20 83.8 kg   08/24/20 84.7 kg   08/05/19 83.6 kg       BMI: There is no height or weight on file to calculate BMI.    REVIEW OF SYSTEMS  GENERAL:  Patient denies headache, fevers, chills, night sweats, excessive fatigue, change in appetite, weight loss, dizziness  ALLERGIC/IMMUNOLOGIC: Verified allergies: Yes  EYES:  Patient denies significant visual difficulties, double vision, blurred vision  ENT/MOUTH: Patient denies problems with hearing, sore throat, sinus drainage, mouth sores  ENDOCRINE:  Patient denies diabetes, thyroid disease, hormone replacement, hot flashes  HEMATOLOGIC/LYMPHATIC: Patient denies easy bruising, bleeding, tender lymph nodes, swollen lymph nodes  BREASTS: Patient denies abnormal masses of breast, nipple discharge, pain  RESPIRATORY:  Patient denies lung pain with breathing, cough, coughing up blood, shortness of breath  CARDIOVASCULAR:  Patient denies anginal chest pain, palpitations, shortness of breath when lying flat, peripheral edema  GASTROINTESTINAL: Patient denies abdominal pain , nausea, vomiting, diarrhea, GI bleeding, constipation, change in bowel habits, heartburn, sensation of feeling full, difficulty swallowing  : Patient denies abnormal genital masses, blood in the urine, frequency, urgency, burning with urination, hesitancy, incontinence, vaginal  bleeding, discharge  MUSCULOSKELETAL:  Patient denies bone pain, joint swelling, redness, decreased range of motion, but complains of: joint pain, pain ratin, location: hands   knuckles  intermittent   SKIN:  Patient denies chronic rashes, inflammation, ulcerations, skin changes, itching  NEUROLOGIC:  Patient denies loss of balance, areas of focal weakness, abnormal gait, sensory problems, numbness, tingling  PSYCHIATRIC: Patient denies insomnia, depression, anxiety                  PHYSICAL EXAMINATION:    Becki Brown is a 58 year old female who is alert, oriented times 3, in no apparent distress.  VITALS:    Oncology Encounter Vitals [21]   ONC OP Encounter Vitals Group      BP       Heart Rate 78      Resp 16      Temp 98.4 °F (36.9 °C)      Temp src Temporal      SpO2 98 %      Weight 186 lb (84.4 kg)      Height 5' 3\" (1.6 m)      Pain Score  0      Pain Location       Pain Education?       BSA (Calculated - m2) - Rebecca & Rebecca 1.88      BMI (Calculated) 32.95       ECOG:   ECOG [21]   ECOG Performance Status 0      General: The patient is alert, well-developed, well-nourished, no distress.  Skin: Warm, normal color, normal texture, normal turgor and without rash.  Head: Normocephalic, atraumatic.  Neck: Supple with no significant adenopathy.  Eyes: Normal conjunctivae and sclerae. Pupils equal, round, reactive to light and accommodation. Extraocular movements intact.  ENT: Mucous membranes are dry and pale. Normal nose,mouth and throat.  Cardiovascular: Regular rate and rhythm, no murmurs, gallops or rubs.  Respiratory: Normal respiratory effort. Clear to auscultation. No wheezes, rales, or rhonchi.  Abdomen: Soft and non tender.  No hepato-splenomegaly or masses.  Extremities: No clubbing, no cyanosis, trace pitting edema. Normal muscle tone and development bilaterally.  Lymph: No cervical, supraclavicular, axillary, inguinal lymphadenopathy.  Neurologic: Motor strength normal.  Coordination normal. No tremor noted.  Psychiatric: Cooperative. Appropriate mood and affect. Normal judgment.       LABS:    Lab Results   Component Value Date/Time    WBC 5.4 08/31/2021 07:47 AM    WBC 5.6 08/02/2019 07:34 AM    RBC 4.48 08/31/2021 07:47 AM    RBC 4.59 08/02/2019 07:34 AM    HGB 14.0 08/31/2021 07:47 AM    HGB 14.1 08/02/2019 07:34 AM    HCT 40.7 08/31/2021 07:47 AM    HCT 41.5 08/02/2019 07:34 AM     08/31/2021 07:47 AM     08/02/2019 07:34 AM     04/25/2014 07:20 AM    MCV 90.8 08/31/2021 07:47 AM    MCV 90.4 08/02/2019 07:34 AM    MCH 31.3 08/31/2021 07:47 AM    MCH 30.7 08/02/2019 07:34 AM    MCHC 34.4 08/31/2021 07:47 AM    MCHC 34.0 08/02/2019 07:34 AM    RDWCV 12.2 08/31/2021 07:47 AM    RDWCV 12.3 08/02/2019 07:34 AM    SEG 47 08/31/2021 07:47 AM    SEG 45 08/02/2019 07:34 AM    SEG 40 04/25/2014 07:20 AM    TLYMPH 39 08/31/2021 07:47 AM    TLYMPH 40 08/02/2019 07:34 AM    PMON 11 08/31/2021 07:47 AM    PMON 11 08/02/2019 07:34 AM    PEOS 2 08/31/2021 07:47 AM    PEOS 3 08/02/2019 07:34 AM    PBASO 1 08/31/2021 07:47 AM    PBASO 1 08/02/2019 07:34 AM    PBASO 0 04/25/2014 07:20 AM    ANEUT 2.6 08/31/2021 07:47 AM    ANEUT 2.5 08/02/2019 07:34 AM    ANEUT 1.53 (A) 04/25/2014 07:20 AM    ALYMS 2.1 08/31/2021 07:47 AM    ALYMS 2.2 08/02/2019 07:34 AM    CLAUDIA 0.6 08/31/2021 07:47 AM    CLAUDIA 0.6 08/02/2019 07:34 AM    AEOS 0.1 08/31/2021 07:47 AM    AEOS 0.2 08/02/2019 07:34 AM    AEOS 0.08 04/25/2014 07:20 AM    ABASO 0.0 08/31/2021 07:47 AM    ABASO 0.0 08/02/2019 07:34 AM    ABASO 0.01 04/25/2014 07:20 AM     Lab Results   Component Value Date/Time    GLUCOSE 106 (H) 08/31/2021 07:47 AM    GLUCOSE 109 (H) 08/02/2019 07:34 AM    SODIUM 142 08/31/2021 07:47 AM    SODIUM 143 08/02/2019 07:34 AM    POTASSIUM 4.1 08/31/2021 07:47 AM    POTASSIUM 4.3 08/02/2019 07:34 AM    CHLORIDE 106 08/31/2021 07:47 AM    CHLORIDE 107 08/02/2019 07:34 AM    BUN 14 08/31/2021 07:47 AM    BUN  13 08/02/2019 07:34 AM    CREATININE 0.72 08/31/2021 07:47 AM    CREATININE 0.60 08/02/2019 07:34 AM    CALCIUM 9.4 08/31/2021 07:47 AM    CALCIUM 9.3 08/02/2019 07:34 AM    CALCIUM 9.8 04/25/2014 07:20 AM    ALBUMIN 3.7 08/31/2021 07:47 AM    ALBUMIN 3.6 08/02/2019 07:34 AM    AST 24 08/31/2021 07:47 AM    AST 20 08/02/2019 07:34 AM    ALKPT 64 08/31/2021 07:47 AM    ALKPT 63 08/02/2019 07:34 AM    GPT 39 08/31/2021 07:47 AM    GPT 36 08/02/2019 07:34 AM    ANIONGAP 12 08/31/2021 07:47 AM    ANIONGAP 13 08/02/2019 07:34 AM    BCRAT 19 08/31/2021 07:47 AM    BCRAT 22 08/02/2019 07:34 AM    GLOB 3.5 08/31/2021 07:47 AM    GLOB 3.4 08/02/2019 07:34 AM    AGR 1.1 08/31/2021 07:47 AM    AGR 1.1 08/02/2019 07:34 AM       Cancer Antigen 125 (Units/mL)   Date Value   08/31/2021 6         RADIOLOGICAL DATA:none    ASSESSMENT/PLAN/RECOMMENDATIONS:  Ms. Becki Brown is a 58 year old year old female with the following hematology/oncology problems:    ASSESSMENT: (C56.2) Malignant neoplasm of ovary, left  (primary encounter diagnosis)  Comment: Based upon history, physical examination, and laboratory studies reviewed during this visit, there appears to be no obvious evidence of disease..  She remains in continued complete remission and requires no treatment for the cancer at this time.   She has completed 7 years of follow up after surgery.  Plan: Continue to observe and monitor.  No intervention is needed at this time   Follow-up in 12 months with:  CBC & AUTO DIFFERENTIAL, , COMPREHENSIVE         METABOLIC PANEL   Prior to visit.    The patient is instructed to call earlier if any questions or concerns.    Thank you for allowing me to participate in her care and please feel free to call if any questions or concerns.        Bean Martínez MD  [facility].    This is a surgical and/or non-medical patient.

## 2021-10-19 RX ORDER — AMOXICILLIN AND CLAVULANATE POTASSIUM 875; 125 MG/1; MG/1
875-125 TABLET, COATED ORAL TWICE DAILY
Qty: 14 | Refills: 0 | Status: COMPLETED | COMMUNITY
Start: 2021-10-19 | End: 2021-10-26

## 2021-10-20 LAB — BACTERIA UR CULT: ABNORMAL

## 2021-12-02 ENCOUNTER — NON-APPOINTMENT (OUTPATIENT)
Age: 56
End: 2021-12-02

## 2021-12-06 DIAGNOSIS — N39.0 URINARY TRACT INFECTION, SITE NOT SPECIFIED: ICD-10-CM

## 2021-12-06 LAB — BACTERIA UR CULT: ABNORMAL

## 2021-12-06 RX ORDER — SULFAMETHOXAZOLE AND TRIMETHOPRIM 800; 160 MG/1; MG/1
800-160 TABLET ORAL TWICE DAILY
Qty: 14 | Refills: 0 | Status: COMPLETED | COMMUNITY
Start: 2021-12-02 | End: 2021-12-06

## 2021-12-06 RX ORDER — DIAZEPAM 5 MG/1
5 TABLET ORAL
Qty: 1 | Refills: 0 | Status: DISCONTINUED | COMMUNITY
Start: 2021-12-06 | End: 2021-12-06

## 2021-12-13 ENCOUNTER — APPOINTMENT (OUTPATIENT)
Dept: UROLOGY | Facility: CLINIC | Age: 56
End: 2021-12-13

## 2021-12-16 ENCOUNTER — APPOINTMENT (OUTPATIENT)
Dept: UROLOGY | Facility: CLINIC | Age: 56
End: 2021-12-16
Payer: COMMERCIAL

## 2021-12-16 ENCOUNTER — OUTPATIENT (OUTPATIENT)
Dept: OUTPATIENT SERVICES | Facility: HOSPITAL | Age: 56
LOS: 1 days | End: 2021-12-16
Payer: COMMERCIAL

## 2021-12-16 VITALS
DIASTOLIC BLOOD PRESSURE: 89 MMHG | TEMPERATURE: 97.5 F | OXYGEN SATURATION: 100 % | SYSTOLIC BLOOD PRESSURE: 132 MMHG | RESPIRATION RATE: 17 BRPM | HEART RATE: 67 BPM | WEIGHT: 162 LBS | HEIGHT: 74 IN | BODY MASS INDEX: 20.79 KG/M2

## 2021-12-16 DIAGNOSIS — Z98.890 OTHER SPECIFIED POSTPROCEDURAL STATES: Chronic | ICD-10-CM

## 2021-12-16 DIAGNOSIS — R35.0 FREQUENCY OF MICTURITION: ICD-10-CM

## 2021-12-16 DIAGNOSIS — Z90.79 ACQUIRED ABSENCE OF OTHER GENITAL ORGAN(S): Chronic | ICD-10-CM

## 2021-12-16 DIAGNOSIS — N21.0 CALCULUS IN BLADDER: ICD-10-CM

## 2021-12-16 DIAGNOSIS — C61 MALIGNANT NEOPLASM OF PROSTATE: ICD-10-CM

## 2021-12-16 PROCEDURE — 52000 CYSTOURETHROSCOPY: CPT

## 2021-12-17 LAB — PSA SERPL-MCNC: <0.01 NG/ML

## 2021-12-17 RX ORDER — CEFDINIR 300 MG/1
300 CAPSULE ORAL
Qty: 14 | Refills: 0 | Status: COMPLETED | COMMUNITY
Start: 2021-12-06 | End: 2021-12-17

## 2021-12-25 ENCOUNTER — NON-APPOINTMENT (OUTPATIENT)
Age: 56
End: 2021-12-25

## 2021-12-26 ENCOUNTER — EMERGENCY (EMERGENCY)
Facility: HOSPITAL | Age: 56
LOS: 1 days | Discharge: HOME | End: 2021-12-26
Attending: STUDENT IN AN ORGANIZED HEALTH CARE EDUCATION/TRAINING PROGRAM | Admitting: STUDENT IN AN ORGANIZED HEALTH CARE EDUCATION/TRAINING PROGRAM
Payer: COMMERCIAL

## 2021-12-26 VITALS
DIASTOLIC BLOOD PRESSURE: 81 MMHG | OXYGEN SATURATION: 99 % | RESPIRATION RATE: 17 BRPM | SYSTOLIC BLOOD PRESSURE: 144 MMHG | TEMPERATURE: 99 F | HEART RATE: 83 BPM

## 2021-12-26 VITALS
HEIGHT: 74 IN | SYSTOLIC BLOOD PRESSURE: 130 MMHG | RESPIRATION RATE: 16 BRPM | DIASTOLIC BLOOD PRESSURE: 76 MMHG | WEIGHT: 164.91 LBS | HEART RATE: 88 BPM | OXYGEN SATURATION: 97 % | TEMPERATURE: 99 F

## 2021-12-26 DIAGNOSIS — Z98.890 OTHER SPECIFIED POSTPROCEDURAL STATES: Chronic | ICD-10-CM

## 2021-12-26 DIAGNOSIS — Z90.79 ACQUIRED ABSENCE OF OTHER GENITAL ORGAN(S): Chronic | ICD-10-CM

## 2021-12-26 LAB
ALBUMIN SERPL ELPH-MCNC: 3.8 G/DL — SIGNIFICANT CHANGE UP (ref 3.3–5)
ALP SERPL-CCNC: 64 U/L — SIGNIFICANT CHANGE UP (ref 40–120)
ALT FLD-CCNC: 13 U/L — SIGNIFICANT CHANGE UP (ref 10–45)
ANION GAP SERPL CALC-SCNC: 7 MMOL/L — SIGNIFICANT CHANGE UP (ref 5–17)
APPEARANCE UR: ABNORMAL
AST SERPL-CCNC: 18 U/L — SIGNIFICANT CHANGE UP (ref 10–40)
BACTERIA # UR AUTO: ABNORMAL /HPF
BASOPHILS # BLD AUTO: 0.1 K/UL — SIGNIFICANT CHANGE UP (ref 0–0.2)
BASOPHILS NFR BLD AUTO: 2 % — SIGNIFICANT CHANGE UP (ref 0–2)
BILIRUB SERPL-MCNC: 0.2 MG/DL — SIGNIFICANT CHANGE UP (ref 0.2–1.2)
BILIRUB UR-MCNC: NEGATIVE — SIGNIFICANT CHANGE UP
BUN SERPL-MCNC: 12 MG/DL — SIGNIFICANT CHANGE UP (ref 7–23)
CALCIUM SERPL-MCNC: 8.9 MG/DL — SIGNIFICANT CHANGE UP (ref 8.4–10.5)
CHLORIDE SERPL-SCNC: 101 MMOL/L — SIGNIFICANT CHANGE UP (ref 96–108)
CO2 SERPL-SCNC: 28 MMOL/L — SIGNIFICANT CHANGE UP (ref 22–31)
COLOR SPEC: ABNORMAL
COMMENT - URINE: SIGNIFICANT CHANGE UP
CREAT SERPL-MCNC: 1.04 MG/DL — SIGNIFICANT CHANGE UP (ref 0.5–1.3)
DACRYOCYTES BLD QL SMEAR: SLIGHT — SIGNIFICANT CHANGE UP
DIFF PNL FLD: ABNORMAL
ELLIPTOCYTES BLD QL SMEAR: SLIGHT — SIGNIFICANT CHANGE UP
EOSINOPHIL # BLD AUTO: 0.05 K/UL — SIGNIFICANT CHANGE UP (ref 0–0.5)
EOSINOPHIL NFR BLD AUTO: 1 % — SIGNIFICANT CHANGE UP (ref 0–6)
EPI CELLS # UR: SIGNIFICANT CHANGE UP
GIANT PLATELETS BLD QL SMEAR: PRESENT — SIGNIFICANT CHANGE UP
GLUCOSE SERPL-MCNC: 86 MG/DL — SIGNIFICANT CHANGE UP (ref 70–99)
GLUCOSE UR QL: NEGATIVE — SIGNIFICANT CHANGE UP
HCT VFR BLD CALC: 49.6 % — SIGNIFICANT CHANGE UP (ref 39–50)
HGB BLD-MCNC: 16.8 G/DL — SIGNIFICANT CHANGE UP (ref 13–17)
KETONES UR-MCNC: ABNORMAL
LACTATE SERPL-SCNC: 0.9 MMOL/L — SIGNIFICANT CHANGE UP (ref 0.7–2)
LEUKOCYTE ESTERASE UR-ACNC: ABNORMAL
LG PLATELETS BLD QL AUTO: SLIGHT — SIGNIFICANT CHANGE UP
LYMPHOCYTES # BLD AUTO: 1.84 K/UL — SIGNIFICANT CHANGE UP (ref 1–3.3)
LYMPHOCYTES # BLD AUTO: 36 % — SIGNIFICANT CHANGE UP (ref 13–44)
MANUAL SMEAR VERIFICATION: SIGNIFICANT CHANGE UP
MCHC RBC-ENTMCNC: 31.3 PG — SIGNIFICANT CHANGE UP (ref 27–34)
MCHC RBC-ENTMCNC: 33.9 GM/DL — SIGNIFICANT CHANGE UP (ref 32–36)
MCV RBC AUTO: 92.5 FL — SIGNIFICANT CHANGE UP (ref 80–100)
MONOCYTES # BLD AUTO: 0.82 K/UL — SIGNIFICANT CHANGE UP (ref 0–0.9)
MONOCYTES NFR BLD AUTO: 16 % — HIGH (ref 2–14)
NEUTROPHILS # BLD AUTO: 2 K/UL — SIGNIFICANT CHANGE UP (ref 1.8–7.4)
NEUTROPHILS NFR BLD AUTO: 39 % — LOW (ref 43–77)
NITRITE UR-MCNC: POSITIVE
NRBC # BLD: 0 /100 — SIGNIFICANT CHANGE UP (ref 0–0)
OVALOCYTES BLD QL SMEAR: SLIGHT — SIGNIFICANT CHANGE UP
PH UR: 6 — SIGNIFICANT CHANGE UP (ref 5–8)
PLAT MORPH BLD: NORMAL — SIGNIFICANT CHANGE UP
PLATELET # BLD AUTO: 181 K/UL — SIGNIFICANT CHANGE UP (ref 150–400)
POTASSIUM SERPL-MCNC: 4 MMOL/L — SIGNIFICANT CHANGE UP (ref 3.5–5.3)
POTASSIUM SERPL-SCNC: 4 MMOL/L — SIGNIFICANT CHANGE UP (ref 3.5–5.3)
PROT SERPL-MCNC: 7.9 G/DL — SIGNIFICANT CHANGE UP (ref 6–8.3)
PROT UR-MCNC: 30 MG/DL
RBC # BLD: 5.36 M/UL — SIGNIFICANT CHANGE UP (ref 4.2–5.8)
RBC # FLD: 14.1 % — SIGNIFICANT CHANGE UP (ref 10.3–14.5)
RBC BLD AUTO: SIGNIFICANT CHANGE UP
RBC CASTS # UR COMP ASSIST: SIGNIFICANT CHANGE UP /HPF (ref 0–4)
SMUDGE CELLS # BLD: PRESENT — SIGNIFICANT CHANGE UP
SODIUM SERPL-SCNC: 136 MMOL/L — SIGNIFICANT CHANGE UP (ref 135–145)
SP GR SPEC: 1.02 — SIGNIFICANT CHANGE UP (ref 1.01–1.02)
UROBILINOGEN FLD QL: NEGATIVE — SIGNIFICANT CHANGE UP
VARIANT LYMPHS # BLD: 6 % — SIGNIFICANT CHANGE UP (ref 0–6)
WBC # BLD: 5.12 K/UL — SIGNIFICANT CHANGE UP (ref 3.8–10.5)
WBC # FLD AUTO: 5.12 K/UL — SIGNIFICANT CHANGE UP (ref 3.8–10.5)
WBC UR QL: ABNORMAL /HPF (ref 0–5)

## 2021-12-26 PROCEDURE — 87077 CULTURE AEROBIC IDENTIFY: CPT

## 2021-12-26 PROCEDURE — 71045 X-RAY EXAM CHEST 1 VIEW: CPT

## 2021-12-26 PROCEDURE — 96361 HYDRATE IV INFUSION ADD-ON: CPT

## 2021-12-26 PROCEDURE — 87086 URINE CULTURE/COLONY COUNT: CPT

## 2021-12-26 PROCEDURE — 80053 COMPREHEN METABOLIC PANEL: CPT

## 2021-12-26 PROCEDURE — 87186 SC STD MICRODIL/AGAR DIL: CPT

## 2021-12-26 PROCEDURE — 81001 URINALYSIS AUTO W/SCOPE: CPT

## 2021-12-26 PROCEDURE — 99285 EMERGENCY DEPT VISIT HI MDM: CPT

## 2021-12-26 PROCEDURE — 87040 BLOOD CULTURE FOR BACTERIA: CPT

## 2021-12-26 PROCEDURE — 36415 COLL VENOUS BLD VENIPUNCTURE: CPT

## 2021-12-26 PROCEDURE — U0003: CPT

## 2021-12-26 PROCEDURE — 99284 EMERGENCY DEPT VISIT MOD MDM: CPT | Mod: 25

## 2021-12-26 PROCEDURE — 71045 X-RAY EXAM CHEST 1 VIEW: CPT | Mod: 26

## 2021-12-26 PROCEDURE — 85025 COMPLETE CBC W/AUTO DIFF WBC: CPT

## 2021-12-26 PROCEDURE — 83605 ASSAY OF LACTIC ACID: CPT

## 2021-12-26 PROCEDURE — 96365 THER/PROPH/DIAG IV INF INIT: CPT

## 2021-12-26 PROCEDURE — U0005: CPT

## 2021-12-26 RX ORDER — CEFTRIAXONE 500 MG/1
1000 INJECTION, POWDER, FOR SOLUTION INTRAMUSCULAR; INTRAVENOUS ONCE
Refills: 0 | Status: COMPLETED | OUTPATIENT
Start: 2021-12-26 | End: 2021-12-26

## 2021-12-26 RX ORDER — CEFPODOXIME PROXETIL 100 MG
1 TABLET ORAL
Qty: 20 | Refills: 0
Start: 2021-12-26 | End: 2022-01-04

## 2021-12-26 RX ORDER — SODIUM CHLORIDE 9 MG/ML
1000 INJECTION INTRAMUSCULAR; INTRAVENOUS; SUBCUTANEOUS ONCE
Refills: 0 | Status: COMPLETED | OUTPATIENT
Start: 2021-12-26 | End: 2021-12-26

## 2021-12-26 RX ADMIN — SODIUM CHLORIDE 1000 MILLILITER(S): 9 INJECTION INTRAMUSCULAR; INTRAVENOUS; SUBCUTANEOUS at 10:36

## 2021-12-26 RX ADMIN — SODIUM CHLORIDE 1000 MILLILITER(S): 9 INJECTION INTRAMUSCULAR; INTRAVENOUS; SUBCUTANEOUS at 09:37

## 2021-12-26 RX ADMIN — CEFTRIAXONE 100 MILLIGRAM(S): 500 INJECTION, POWDER, FOR SOLUTION INTRAMUSCULAR; INTRAVENOUS at 13:31

## 2021-12-26 RX ADMIN — CEFTRIAXONE 1000 MILLIGRAM(S): 500 INJECTION, POWDER, FOR SOLUTION INTRAMUSCULAR; INTRAVENOUS at 14:45

## 2021-12-26 NOTE — ED PROVIDER NOTE - PATIENT PORTAL LINK FT
You can access the FollowMyHealth Patient Portal offered by [facility] by registering at the following website: [email or web address]. By joining [employer]’s FollowMyHealth portal, you will also be able to view your health information using other applications (apps) compatible with our system.

## 2021-12-26 NOTE — ED PROVIDER NOTE - CLINICAL SUMMARY MEDICAL DECISION MAKING FREE TEXT BOX
57 yo m Hx of prostate CA sp RALP, pw hematuria x 4 days and intermittent fever x 2 days. Afebrile in the ED, non tender abd, no CVA tenderness, clear lungs, no CP, no SOB. DDX: UTI, COVID, pyelonephritis, urosepsis. Labs, UA, blood cultures and Urine cultures. currently Afebrile and not meeting sepsis criteria. Reassess for disposition.  *The above represents an initial assessment/impression. Please refer to progress notes for potential changes in patient clinical course*

## 2021-12-26 NOTE — ED PROVIDER NOTE - PROGRESS NOTE DETAILS
Salome Castellon M.D. Tox Fellow Salome Castellon M.D. Tox Fellow  Pt offered admission for IV abx until blood cultures return, but pt strongly declined admission. Pt understands that cultures are pending and he may need to reutn if cutlres are positive. Salome Castellon M.D. Tox Fellow  Pt offered admission for IV abx until blood cultures return, but pt strongly declined admission. Pt understands that cultures are pending and he may need to return if cultures are positive. Salome Castellon M.D. Tox Fellow  No documented fever in the ED, no leukocytosis, + UA. Uculture and blood cultures sent. Pt reassessed, well appearing and endorse feeling well. Pt offered admission for IV abx until blood cultures return, given report of intermittent fevers. Patient strongly declined admission. Pt understands and agrees to if pending blood cultures are positive.

## 2021-12-26 NOTE — ED PROVIDER NOTE - NSFOLLOWUPINSTRUCTIONS_ED_ALL_ED_FT
Blood cultures and urine cultures were drawn, they take 1-2 days to comeback. IF they are positive we will reach out to you.     You are prescribed cefpodoxime. Take as directed by your pharmacist.     Take over the counter Tylenol for pain and fever-- follow dosing directions on original bottle.    ** Follow up with your primary care urologist and primary care doctor in the next 72 hours.     ** Go to the nearest Emergency Department if you experience any new or concerning symptoms, such as:   - chest pain  - difficulty breathing  - passing out  - unable to eat or drink  - unable to move or feel part of your body  - fever, chills Blood cultures and urine cultures were drawn, they take 1-2 days to comeback. IF they are positive we will reach out to you.     You are prescribed cefpodoxime. Take as directed by your pharmacist.     Take over the counter Tylenol for pain and fever-- follow dosing directions on original bottle.    ** Follow up with your primary care urologist and primary care doctor in the next 72 hours.     ** Go to the nearest Emergency Department if you experience any new or concerning symptoms, such as:   - chest pain  - difficulty breathing  - passing out  - unable to eat or drink  - unable to move or feel part of your body  - severe back pain  - fever, chills

## 2021-12-26 NOTE — ED ADULT NURSE NOTE - OBJECTIVE STATEMENT
56 yr old male c/o fever and hematuria. Pt states I had blood in my urine and fever. PMH kidney stone

## 2021-12-26 NOTE — ED PROVIDER NOTE - OBJECTIVE STATEMENT
fever 102 yesterday, took one tylenol 57 yo m Hx of prostate CA sp RALP Jan 2021,  fever 102 yesterday, took one tylenol 57 yo m Hx of prostate CA sp RALP,  intermittent fever, Tmax 102, took Tylenol at 2AM, 57 yo m Hx of prostate CA sp RALP, pw hematuria x 4 days and intermittent fever x 2 days. on 12/16, pt had cytoscopy which identified a stone, was set to have urological procedure to remove stone in 01/14 with Dr. Matthew. Pt believe he passed a stone 4 days ago (severe pain, swelling of penis, sensation of passing stone). Pt has been having intermittent hematuria and passing clots since passing stone. Pt also with intermittent fever, Tmax 102, last took Tylenol at 2AM. Currently afebrile, with no pain in the ED. Pt offered analgesia, declined. 55 yo m Hx of prostate CA sp RALP, pw hematuria x 4 days and intermittent fever x 2 days. on 12/16, pt had cytoscopy which identified a stone, was set to have urological procedure to remove stone in 01/14 with Dr. Matthew. Pt believe he passed a stone 4 days ago (severe pain, swelling of penis, sensation of passing stone). Pt has been having intermittent hematuria and passing clots since passing stone. Pt also with intermittent fever, Tmax 102.6, last took Tylenol at 2AM. Currently afebrile, with no pain in the ED. Pt offered analgesia, declined.

## 2021-12-27 LAB — SARS-COV-2 RNA SPEC QL NAA+PROBE: DETECTED

## 2021-12-31 LAB
CULTURE RESULTS: SIGNIFICANT CHANGE UP
CULTURE RESULTS: SIGNIFICANT CHANGE UP
SPECIMEN SOURCE: SIGNIFICANT CHANGE UP
SPECIMEN SOURCE: SIGNIFICANT CHANGE UP

## 2022-01-07 ENCOUNTER — OUTPATIENT (OUTPATIENT)
Dept: OUTPATIENT SERVICES | Facility: HOSPITAL | Age: 57
LOS: 1 days | End: 2022-01-07
Payer: COMMERCIAL

## 2022-01-07 ENCOUNTER — APPOINTMENT (OUTPATIENT)
Dept: CT IMAGING | Facility: HOSPITAL | Age: 57
End: 2022-01-07
Payer: COMMERCIAL

## 2022-01-07 DIAGNOSIS — Z98.890 OTHER SPECIFIED POSTPROCEDURAL STATES: Chronic | ICD-10-CM

## 2022-01-07 DIAGNOSIS — N21.0 CALCULUS IN BLADDER: ICD-10-CM

## 2022-01-07 DIAGNOSIS — Z90.79 ACQUIRED ABSENCE OF OTHER GENITAL ORGAN(S): Chronic | ICD-10-CM

## 2022-01-07 PROCEDURE — 72192 CT PELVIS W/O DYE: CPT

## 2022-01-07 PROCEDURE — 72192 CT PELVIS W/O DYE: CPT | Mod: 26

## 2022-01-08 ENCOUNTER — TRANSCRIPTION ENCOUNTER (OUTPATIENT)
Age: 57
End: 2022-01-08

## 2022-01-10 ENCOUNTER — OUTPATIENT (OUTPATIENT)
Dept: OUTPATIENT SERVICES | Facility: HOSPITAL | Age: 57
LOS: 1 days | End: 2022-01-10
Payer: COMMERCIAL

## 2022-01-10 VITALS
DIASTOLIC BLOOD PRESSURE: 84 MMHG | HEART RATE: 76 BPM | HEIGHT: 72.5 IN | TEMPERATURE: 98 F | OXYGEN SATURATION: 98 % | SYSTOLIC BLOOD PRESSURE: 120 MMHG | RESPIRATION RATE: 18 BRPM | WEIGHT: 154.1 LBS

## 2022-01-10 DIAGNOSIS — Z98.890 OTHER SPECIFIED POSTPROCEDURAL STATES: Chronic | ICD-10-CM

## 2022-01-10 DIAGNOSIS — Z90.79 ACQUIRED ABSENCE OF OTHER GENITAL ORGAN(S): Chronic | ICD-10-CM

## 2022-01-10 DIAGNOSIS — N21.0 CALCULUS IN BLADDER: ICD-10-CM

## 2022-01-10 LAB
ANION GAP SERPL CALC-SCNC: 12 MMOL/L — SIGNIFICANT CHANGE UP (ref 7–14)
BUN SERPL-MCNC: 11 MG/DL — SIGNIFICANT CHANGE UP (ref 7–23)
CALCIUM SERPL-MCNC: 9.5 MG/DL — SIGNIFICANT CHANGE UP (ref 8.4–10.5)
CHLORIDE SERPL-SCNC: 101 MMOL/L — SIGNIFICANT CHANGE UP (ref 98–107)
CO2 SERPL-SCNC: 25 MMOL/L — SIGNIFICANT CHANGE UP (ref 22–31)
CREAT SERPL-MCNC: 0.7 MG/DL — SIGNIFICANT CHANGE UP (ref 0.5–1.3)
GLUCOSE SERPL-MCNC: 56 MG/DL — LOW (ref 70–99)
HCT VFR BLD CALC: 45.5 % — SIGNIFICANT CHANGE UP (ref 39–50)
HGB BLD-MCNC: 15.6 G/DL — SIGNIFICANT CHANGE UP (ref 13–17)
MCHC RBC-ENTMCNC: 31.1 PG — SIGNIFICANT CHANGE UP (ref 27–34)
MCHC RBC-ENTMCNC: 34.3 GM/DL — SIGNIFICANT CHANGE UP (ref 32–36)
MCV RBC AUTO: 90.8 FL — SIGNIFICANT CHANGE UP (ref 80–100)
NRBC # BLD: 0 /100 WBCS — SIGNIFICANT CHANGE UP
NRBC # FLD: 0 K/UL — SIGNIFICANT CHANGE UP
PLATELET # BLD AUTO: 275 K/UL — SIGNIFICANT CHANGE UP (ref 150–400)
POTASSIUM SERPL-MCNC: 3.9 MMOL/L — SIGNIFICANT CHANGE UP (ref 3.5–5.3)
POTASSIUM SERPL-SCNC: 3.9 MMOL/L — SIGNIFICANT CHANGE UP (ref 3.5–5.3)
RBC # BLD: 5.01 M/UL — SIGNIFICANT CHANGE UP (ref 4.2–5.8)
RBC # FLD: 13.9 % — SIGNIFICANT CHANGE UP (ref 10.3–14.5)
SODIUM SERPL-SCNC: 138 MMOL/L — SIGNIFICANT CHANGE UP (ref 135–145)
WBC # BLD: 8.6 K/UL — SIGNIFICANT CHANGE UP (ref 3.8–10.5)
WBC # FLD AUTO: 8.6 K/UL — SIGNIFICANT CHANGE UP (ref 3.8–10.5)

## 2022-01-10 PROCEDURE — 93010 ELECTROCARDIOGRAM REPORT: CPT

## 2022-01-10 RX ORDER — MIRABEGRON 50 MG/1
1 TABLET, EXTENDED RELEASE ORAL
Qty: 0 | Refills: 0 | DISCHARGE

## 2022-01-10 NOTE — H&P PST ADULT - PROBLEM SELECTOR PLAN 1
Scheduled for cystoscopy, laser litholapaxy tentatively on 01/14/2022. Pre op instructions, famotidine given and explained. Pt verbalized understanding.   Pt confirmed schedule appt for Covid test pre op

## 2022-01-10 NOTE — H&P PST ADULT - NEGATIVE ENMT SYMPTOMS
no hearing difficulty/no ear pain/no tinnitus/no vertigo/no nasal congestion/no nasal obstruction/no nose bleeds/no dry mouth/no throat pain/no dysphagia

## 2022-01-10 NOTE — H&P PST ADULT - NSANTHOSAYNRD_GEN_A_CORE
No. TAMIKA screening performed.  STOP BANG Legend: 0-2 = LOW Risk; 3-4 = INTERMEDIATE Risk; 5-8 = HIGH Risk

## 2022-01-10 NOTE — H&P PST ADULT - HISTORY OF PRESENT ILLNESS
57 y/o male  55 y/o male with H/O: prostate cancer - s/p robotic assisted radical prostatectomy6 12/2020 presents with pre op diagnosis calculus in bladder in preparation for cystoscopy, laser litholapaxy

## 2022-01-10 NOTE — H&P PST ADULT - NSICDXPASTSURGICALHX_GEN_ALL_CORE_FT
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

## 2022-01-12 DIAGNOSIS — N21.0 CALCULUS IN BLADDER: ICD-10-CM

## 2022-01-12 RX ORDER — CEFDINIR 300 MG/1
300 CAPSULE ORAL
Qty: 10 | Refills: 0 | Status: COMPLETED | COMMUNITY
Start: 2022-01-12 | End: 2022-01-18

## 2022-01-13 ENCOUNTER — TRANSCRIPTION ENCOUNTER (OUTPATIENT)
Age: 57
End: 2022-01-13

## 2022-01-13 VITALS
TEMPERATURE: 98 F | DIASTOLIC BLOOD PRESSURE: 63 MMHG | HEIGHT: 72.5 IN | WEIGHT: 154.1 LBS | SYSTOLIC BLOOD PRESSURE: 126 MMHG | RESPIRATION RATE: 16 BRPM | HEART RATE: 77 BPM | OXYGEN SATURATION: 97 %

## 2022-01-13 NOTE — ASU PREOPERATIVE ASSESSMENT, ADULT (IPARK ONLY) - FALL HARM RISK - UNIVERSAL INTERVENTIONS
Bed in lowest position, wheels locked, appropriate side rails in place/Call bell, personal items and telephone in reach/Instruct patient to call for assistance before getting out of bed or chair/Non-slip footwear when patient is out of bed/[place] to call system/Physically safe environment - no spills, clutter or unnecessary equipment/Purposeful Proactive Rounding/Room/bathroom lighting operational, light cord in reach

## 2022-01-14 ENCOUNTER — APPOINTMENT (OUTPATIENT)
Dept: UROLOGY | Facility: AMBULATORY SURGERY CENTER | Age: 57
End: 2022-01-14

## 2022-01-14 ENCOUNTER — OUTPATIENT (OUTPATIENT)
Dept: OUTPATIENT SERVICES | Facility: HOSPITAL | Age: 57
LOS: 1 days | Discharge: HOME | End: 2022-01-14
Payer: COMMERCIAL

## 2022-01-14 VITALS
TEMPERATURE: 97 F | SYSTOLIC BLOOD PRESSURE: 113 MMHG | HEART RATE: 76 BPM | OXYGEN SATURATION: 96 % | DIASTOLIC BLOOD PRESSURE: 67 MMHG

## 2022-01-14 DIAGNOSIS — Z98.890 OTHER SPECIFIED POSTPROCEDURAL STATES: Chronic | ICD-10-CM

## 2022-01-14 DIAGNOSIS — N21.0 CALCULUS IN BLADDER: ICD-10-CM

## 2022-01-14 DIAGNOSIS — Z90.79 ACQUIRED ABSENCE OF OTHER GENITAL ORGAN(S): Chronic | ICD-10-CM

## 2022-01-14 PROCEDURE — 52317 REMOVE BLADDER STONE: CPT

## 2022-01-14 RX ORDER — CEFDINIR 250 MG/5ML
1 POWDER, FOR SUSPENSION ORAL
Qty: 0 | Refills: 0 | DISCHARGE

## 2022-01-14 RX ORDER — SODIUM CHLORIDE 9 MG/ML
1000 INJECTION, SOLUTION INTRAVENOUS
Refills: 0 | Status: DISCONTINUED | OUTPATIENT
Start: 2022-01-14 | End: 2022-01-29

## 2022-01-14 RX ORDER — MIRABEGRON 50 MG/1
1 TABLET, EXTENDED RELEASE ORAL
Qty: 0 | Refills: 0 | DISCHARGE

## 2022-01-14 NOTE — ASU DISCHARGE PLAN (ADULT/PEDIATRIC) - NS MD DC FALL RISK RISK
For information on Fall & Injury Prevention, visit: [email or web address] OR  [email or web address] OR  https://www.cdc.gov/steadi/patient.html

## 2022-01-14 NOTE — ASU DISCHARGE PLAN (ADULT/PEDIATRIC) - CARE PROVIDER_API CALL
Trace Delgadillo)  Urology  [address] [place] [address], [place] [address]  Phone: [phone]  Fax: [phone]  Established Patient  Follow Up Time: 1 month

## 2022-01-16 LAB
CULTURE RESULTS: SIGNIFICANT CHANGE UP
SPECIMEN SOURCE: SIGNIFICANT CHANGE UP

## 2022-01-18 LAB — NIDUS STONE QN: SIGNIFICANT CHANGE UP

## 2022-03-15 ENCOUNTER — APPOINTMENT (OUTPATIENT)
Dept: UROLOGY | Facility: CLINIC | Age: 57
End: 2022-03-15
Payer: COMMERCIAL

## 2022-03-15 PROCEDURE — 99213 OFFICE O/P EST LOW 20 MIN: CPT

## 2022-03-15 RX ORDER — MIRABEGRON 50 MG/1
50 TABLET, FILM COATED, EXTENDED RELEASE ORAL
Qty: 90 | Refills: 3 | Status: COMPLETED | COMMUNITY
End: 2022-03-15

## 2022-03-23 LAB
PSA SERPL-MCNC: <0.01 NG/ML
TESTOST SERPL-MCNC: 515 NG/DL

## 2022-03-25 NOTE — DISEASE MANAGEMENT
[1] : T1 [c] : c [0-10] : 0 -10 ng/mL [3] : 3 [I] : I [Radical Prostatectomy] : Radical Prostatectomy [Patient had a radical prostatectomy] : Patient had a radical prostatectomy  [6] : Morrison Score 6 [Negative] : Negative margins [2] : T2 [0] : N0 [X] : MX [] : Patient had no Bone Scan performed [TotalCores] : 12 [TotalPositiveCores] : 1 [MaxCoreInvolvement] : 10% [FreeTextEntry7] : PSA prior to biopsy: 5.44 ng/mL.\par MRI prostate 37 mL volume with intravesical protrusion of median lobe, PIRAD 3 left sided lesion. [RadicalProstatectomyDate] : 1/4/2021 [TotalNumberofnodesresected] : 1 [PositiveNodes] : 1

## 2022-03-25 NOTE — HISTORY OF PRESENT ILLNESS
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

## 2022-03-28 ENCOUNTER — NON-APPOINTMENT (OUTPATIENT)
Age: 57
End: 2022-03-28

## 2022-04-17 NOTE — PROVIDER CONTACT NOTE (OTHER) - ASSESSMENT
Patient's gown and sheets were wet. Villanueva is still draining in to bag.
Upon assessment, patient's ghotra catheter is intact and  no alarming redness noted.
.

## 2022-06-15 NOTE — ASU DISCHARGE PLAN (ADULT/PEDIATRIC) - ***IN THE EVENT THAT YOU DEVELOP A COMPLICATION AND YOU ARE UNABLE TO REACH YOUR OWN PHYSICIAN, YOU MAY CONTACT:
Pt brought [place] paperwork to appointment today with Dr. Terrazas.  Writer copied paperwork, faxed and sent to stat dannielle harris.     Jennifer Vega-  Jay Jay Spencer   
Statement Selected

## 2022-09-28 ENCOUNTER — NON-APPOINTMENT (OUTPATIENT)
Age: 57
End: 2022-09-28

## 2022-10-13 ENCOUNTER — NON-APPOINTMENT (OUTPATIENT)
Age: 57
End: 2022-10-13

## 2022-12-20 ENCOUNTER — APPOINTMENT (OUTPATIENT)
Dept: UROLOGY | Facility: CLINIC | Age: 57
End: 2022-12-20
Payer: COMMERCIAL

## 2022-12-20 VITALS
WEIGHT: 164 LBS | BODY MASS INDEX: 21.05 KG/M2 | SYSTOLIC BLOOD PRESSURE: 134 MMHG | HEIGHT: 74 IN | RESPIRATION RATE: 16 BRPM | DIASTOLIC BLOOD PRESSURE: 84 MMHG | HEART RATE: 66 BPM

## 2022-12-20 DIAGNOSIS — N52.31 ERECTILE DYSFUNCTION FOLLOWING RADICAL PROSTATECTOMY: ICD-10-CM

## 2022-12-20 DIAGNOSIS — C61 MALIGNANT NEOPLASM OF PROSTATE: ICD-10-CM

## 2022-12-20 PROCEDURE — 99213 OFFICE O/P EST LOW 20 MIN: CPT

## 2022-12-22 LAB — PSA SERPL-MCNC: <0.01 NG/ML

## 2022-12-30 NOTE — DISEASE MANAGEMENT
[1] : T1 [c] : c [0-10] : 0 -10 ng/mL [Biopsy results sent to PCP/Referring Physician] : Biopsy results sent to PCP/Referring Physician [] : Patient had a Prostate MRI [3] : 3 [BiopsyDate] : 10/13/2020 [TotalCores] : 12 [TotalPositiveCores] : 1 [MaxCoreInvolvement] : 10% [FreeTextEntry7] : PSA prior to biopsy: 5.44 ng/mL.\par MRI prostate 37 mL volume with intravesical protrusion of median lobe, PIRAD 3 left sided lesion. [I] : I [Radical Prostatectomy] : Radical Prostatectomy [Patient had a radical prostatectomy] : Patient had a radical prostatectomy  [6] : [place] Score 6 [Negative] : Negative margins [2] : T2 [0] : N0 [X] : MX [RadicalProstatectomyDate] : 1/4/2021 [TotalNumberofnodesresected] : 1 [PositiveNodes] : 0

## 2022-12-30 NOTE — HISTORY OF PRESENT ILLNESS
[FreeTextEntry1] : Doing well.  Here for follow-up visit.\par Last PSA was undetectable.\par Urinary function stable.  No significant incontinence.  Feels that his urine flow is okay.  Occasional urinary frequency but not bothersome.  He is currently not on any chronic medications.  Continues to have post prostatectomy erectile dysfunction occasionally feels that he has a normal full erection in the morning.  He is currently not sexually active.

## 2022-12-30 NOTE — PHYSICAL EXAM
[FreeTextEntry1] : Gen:  No apparent distress\par Abdomen: soft, non tender, non distended.  \par :  normal bladder.\par Ext: no edema.  \par Neuro: Normal gait, normal LE strength.

## 2023-10-28 ENCOUNTER — NON-APPOINTMENT (OUTPATIENT)
Age: 58
End: 2023-10-28

## 2024-03-11 ENCOUNTER — NON-APPOINTMENT (OUTPATIENT)
Age: 59
End: 2024-03-11

## 2024-03-11 ENCOUNTER — APPOINTMENT (OUTPATIENT)
Dept: ORTHOPEDIC SURGERY | Facility: CLINIC | Age: 59
End: 2024-03-11
Payer: COMMERCIAL

## 2024-03-11 VITALS
DIASTOLIC BLOOD PRESSURE: 89 MMHG | SYSTOLIC BLOOD PRESSURE: 148 MMHG | BODY MASS INDEX: 20.53 KG/M2 | WEIGHT: 160 LBS | HEART RATE: 77 BPM | HEIGHT: 74 IN

## 2024-03-11 PROCEDURE — 73502 X-RAY EXAM HIP UNI 2-3 VIEWS: CPT

## 2024-03-11 PROCEDURE — 99204 OFFICE O/P NEW MOD 45 MIN: CPT

## 2024-04-02 NOTE — DISCUSSION/SUMMARY
[de-identified] : After thorough history full examination and review of x-rays.  It was explained to the patient that the discomfort he is feeling in his hip may be due to a tendon or possible hip flexors.  He was explained that the x-rays did look negative.  And that much of his discomfort may come from soft tissue.  He was explained that rest elevation ice and anti-inflammatories may improve his condition.  As well as some stretching exercises.  He was explained that if this does persist an MRI may be warranted to further evaluate the soft tissue.  However the patient stated he would like to continue with his conservative measures.  He states that he was worried that if there was any fracture or even osteoarthritic changes of the hip for which she was assured there was none seen on examination.  Therefore he will continue with conservative measures.  Should this pain persist or worsen he will return back to the office or call the office for the MRI.  45 minutes were spent, face to face, in direct consultation with the patient. This includes reviewing the natural history of their Dx., eliciting the history, performing an orthopedic exam, review of the x-ray findings, forming a differential Dx and discussing all treatment options. This Includes both surgical and non-surgical treatments. I also reviewed all the risks and benefits of non-operative & operative Tx options, future impact into orthopedic functions/problems, activity restrictions both at home and at work, and all follow up requirements.

## 2024-04-02 NOTE — PHYSICAL EXAM
[de-identified] : On physical examination of the right hip.  The skin is clean dry and intact with no areas of redness swelling or heat noted.  There is a mild pain and tenderness in the anterior aspect of his hip.  However he does have great range of motion in all planes.  There is no evidence of limb length discrepancy.  No evidence of any muscle atrophy.  No evidence of any motor or sensory deficit.  Patient has good distal pulses and no calf tenderness. [de-identified] : X-rays of the right hip are negative

## 2024-04-02 NOTE — HISTORY OF PRESENT ILLNESS
[de-identified] : Patient is a 59-year-old male who presents today for an evaluation regarding his right hip.  He states that he has noticed a recent clicking/popping sensation in his right hip without discomfort.  He states that this has been going on a while and was not treated medically.  He denies any specific injury or accident.  He states that he does take Aleve as necessary.  And if he does have discomfort from this popping sensation it could be up to a 5 on a scale of 1-10.

## 2024-04-25 ENCOUNTER — APPOINTMENT (OUTPATIENT)
Dept: UROLOGY | Facility: CLINIC | Age: 59
End: 2024-04-25

## 2024-07-01 ENCOUNTER — APPOINTMENT (OUTPATIENT)
Dept: ORTHOPEDIC SURGERY | Facility: CLINIC | Age: 59
End: 2024-07-01
Payer: COMMERCIAL

## 2024-07-01 VITALS — SYSTOLIC BLOOD PRESSURE: 127 MMHG | HEART RATE: 58 BPM | DIASTOLIC BLOOD PRESSURE: 81 MMHG

## 2024-07-01 DIAGNOSIS — M79.89 OTHER SPECIFIED SOFT TISSUE DISORDERS: ICD-10-CM

## 2024-07-01 DIAGNOSIS — M25.551 PAIN IN RIGHT HIP: ICD-10-CM

## 2024-07-01 PROCEDURE — 99213 OFFICE O/P EST LOW 20 MIN: CPT

## 2024-07-02 PROBLEM — M25.551 PAIN OF RIGHT HIP JOINT: Status: ACTIVE | Noted: 2024-03-11

## 2024-07-02 PROBLEM — M79.89 RIGHT LEG SWELLING: Status: ACTIVE | Noted: 2021-01-06

## 2024-08-10 ENCOUNTER — APPOINTMENT (OUTPATIENT)
Dept: ORTHOPEDIC SURGERY | Facility: CLINIC | Age: 59
End: 2024-08-10

## 2024-08-10 PROBLEM — M25.851 RIGHT HIP IMPINGEMENT SYNDROME: Status: ACTIVE | Noted: 2024-08-10

## 2024-08-10 PROCEDURE — 99205 OFFICE O/P NEW HI 60 MIN: CPT

## 2024-08-10 NOTE — PHYSICAL EXAM
[Normal] : No costovertebral angle tenderness and no spinal tenderness [de-identified] : Physical exam reveals pleasant male no apparent distress motor and sensory grossly intact lower extremities negative PIPPA test bilaterally.  Full range of motion of the right hip in all planes leg lengths do appear equal on examination table [de-identified] : MRI of the right hip dated July 9, 2024 reveals moderate cartilage wear along the central weightbearing surface of the femoral head cystic change at the anterior femoral head neck junction tearing of the superior labral acetabular labrum ischiofemoral impingement with mass effect upon the quadratus for Juvenal muscle

## 2024-08-10 NOTE — HISTORY OF PRESENT ILLNESS
[de-identified] : 59-year-old male presents to the office for further evaluation following right hip discomfort patient has been seen twice in the office last visit he did have an MRI scheduled he complains of ischial pain in the right buttocks region with a popping and clicking sensation

## 2024-08-10 NOTE — DISCUSSION/SUMMARY
[de-identified] : Based on physical exam findings and the MRI findings I would like to try an ultrasound-guided ischiofemoral cortisone injection to see if it provides the patient relief.  Will see him back in follow-up following the injection to see if any relief was obtained.  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

## 2024-09-16 ENCOUNTER — NON-APPOINTMENT (OUTPATIENT)
Age: 59
End: 2024-09-16

## 2024-09-17 ENCOUNTER — APPOINTMENT (OUTPATIENT)
Dept: UROLOGY | Facility: CLINIC | Age: 59
End: 2024-09-17
Payer: COMMERCIAL

## 2024-09-17 VITALS — SYSTOLIC BLOOD PRESSURE: 150 MMHG | RESPIRATION RATE: 17 BRPM | HEART RATE: 76 BPM | DIASTOLIC BLOOD PRESSURE: 84 MMHG

## 2024-09-17 DIAGNOSIS — N39.3 STRESS INCONTINENCE (FEMALE) (MALE): ICD-10-CM

## 2024-09-17 DIAGNOSIS — N52.31 ERECTILE DYSFUNCTION FOLLOWING RADICAL PROSTATECTOMY: ICD-10-CM

## 2024-09-17 DIAGNOSIS — C61 MALIGNANT NEOPLASM OF PROSTATE: ICD-10-CM

## 2024-09-17 PROCEDURE — 99213 OFFICE O/P EST LOW 20 MIN: CPT

## 2024-09-17 PROCEDURE — G2211 COMPLEX E/M VISIT ADD ON: CPT | Mod: NC

## 2024-09-17 NOTE — DISEASE MANAGEMENT
[1] : T1 [c] : c [0-10] : 0 -10 ng/mL [Biopsy results sent to PCP/Referring Physician] : Biopsy results sent to PCP/Referring Physician [] : Patient had a Prostate MRI [3] : 3 [BiopsyDate] : 10/13/2020 [TotalCores] : 12 [TotalPositiveCores] : 1 [MaxCoreInvolvement] : 10% [FreeTextEntry7] : PSA prior to biopsy: 5.44 ng/mL.\par  MRI prostate 37 mL volume with intravesical protrusion of median lobe, PIRAD 3 left sided lesion. [I] : I [Radical Prostatectomy] : Radical Prostatectomy [Patient had a radical prostatectomy] : Patient had a radical prostatectomy  [6] : Seaford Score 6 [Negative] : Negative margins [2] : T2 [0] : N0 [X] : MX [RadicalProstatectomyDate] : 1/4/2021 [TotalNumberofnodesresected] : 1 [PositiveNodes] : 0

## 2024-09-17 NOTE — HISTORY OF PRESENT ILLNESS
[FreeTextEntry1] : Doing well.  Here for follow-up visit. Last PSA was undetectable. Urinary function stable.  No significant incontinence. Good flow. Occasional frequency, urgency.   Occasional morning erections but with manual stimulation, 50% fullness.  Not currently sexually active.

## 2024-09-25 LAB — PSA SERPL-MCNC: <0.01 NG/ML

## 2024-10-28 NOTE — DIETITIAN INITIAL EVALUATION ADULT. - HEIGHT FOR BMI (CENTIMETERS)
Infusion Nursing Note:  Hortencia Baldwin presents today for therapeutic phlebotomy.    Patient seen by provider today: No   present during visit today: Not Applicable.    Note: Lab results monitored; met treatment parameters for therapeutic phlebotomy. Patient presents feeling well, no clinical complaints other than chronic knee pain/joint pain. 500mL blood removed via gravity phlebotomy. Given 500cc NS bolus infused post phleb d/t mild lightheadedness. VSS post infusion.    Intravenous Access:  Peripheral IV placed using ultrasound  Removed prior to discharge.    Treatment Conditions:  Results reviewed, labs MET treatment parameters, ok to proceed with treatment.      Post Infusion Assessment:  Patient tolerated infusion without incident.       Discharge Plan:   Patient discharged in stable condition accompanied by: .  Departure Mode: Ambulatory.      Eneida Obando RN     185.4

## 2024-12-26 NOTE — ASU PREOPERATIVE ASSESSMENT, ADULT ([facility] ONLY) - WEIGHT IN LBS
Patient attended Phase 2 Cardiac Rehab Exercise Session. Documentation can be found under Media Tab.   154.1

## 2025-04-21 ENCOUNTER — APPOINTMENT (OUTPATIENT)
Dept: ORTHOPEDIC SURGERY | Facility: CLINIC | Age: 60
End: 2025-04-21
Payer: COMMERCIAL

## 2025-04-21 VITALS — WEIGHT: 150 LBS | BODY MASS INDEX: 19.25 KG/M2 | HEIGHT: 74 IN

## 2025-04-21 DIAGNOSIS — M48.02 SPINAL STENOSIS, CERVICAL REGION: ICD-10-CM

## 2025-04-21 DIAGNOSIS — G95.9 DISEASE OF SPINAL CORD, UNSPECIFIED: ICD-10-CM

## 2025-04-21 PROCEDURE — 99215 OFFICE O/P EST HI 40 MIN: CPT

## 2025-09-18 ENCOUNTER — EMERGENCY (EMERGENCY)
Facility: HOSPITAL | Age: 60
LOS: 1 days | End: 2025-09-18
Attending: EMERGENCY MEDICINE | Admitting: EMERGENCY MEDICINE
Payer: COMMERCIAL

## 2025-09-18 VITALS
HEART RATE: 67 BPM | DIASTOLIC BLOOD PRESSURE: 74 MMHG | SYSTOLIC BLOOD PRESSURE: 133 MMHG | OXYGEN SATURATION: 99 % | RESPIRATION RATE: 16 BRPM

## 2025-09-18 VITALS
WEIGHT: 149.91 LBS | HEIGHT: 74 IN | RESPIRATION RATE: 18 BRPM | OXYGEN SATURATION: 96 % | TEMPERATURE: 98 F | HEART RATE: 78 BPM | DIASTOLIC BLOOD PRESSURE: 50 MMHG | SYSTOLIC BLOOD PRESSURE: 125 MMHG

## 2025-09-18 DIAGNOSIS — Z98.890 OTHER SPECIFIED POSTPROCEDURAL STATES: Chronic | ICD-10-CM

## 2025-09-18 DIAGNOSIS — Z90.79 ACQUIRED ABSENCE OF OTHER GENITAL ORGAN(S): Chronic | ICD-10-CM

## 2025-09-18 PROCEDURE — 93971 EXTREMITY STUDY: CPT | Mod: 26,RT

## 2025-09-18 PROCEDURE — 93971 EXTREMITY STUDY: CPT

## 2025-09-18 PROCEDURE — 99284 EMERGENCY DEPT VISIT MOD MDM: CPT | Mod: 25

## 2025-09-18 PROCEDURE — 99284 EMERGENCY DEPT VISIT MOD MDM: CPT

## (undated) DEVICE — GOWN XL W TOWEL

## (undated) DEVICE — GLV 7.5 PROTEXIS (WHITE)

## (undated) DEVICE — ELCTR CUTTING LOOP 24FR RIGHT ANGLE

## (undated) DEVICE — PREP BETADINE SPONGE STICKS

## (undated) DEVICE — LABELS BLANK W PEN

## (undated) DEVICE — DRAPE BACK TABLE COVER 44X90"

## (undated) DEVICE — BASIN SET DOUBLE

## (undated) DEVICE — TUBING SUCTION NONCONDUCTIVE 6MM X 12FT

## (undated) DEVICE — SOL IRR POUR H2O 500ML

## (undated) DEVICE — SOL IRR BAG H2O 3000ML

## (undated) DEVICE — ELCTR GROUNDING PAD ADULT COVIDIEN

## (undated) DEVICE — POSITIONER STRAP ARMBOARD VELCRO TS-30

## (undated) DEVICE — VENODYNE/SCD SLEEVE CALF MEDIUM

## (undated) DEVICE — TUBING TUR 2 PRONG

## (undated) DEVICE — WARMING BLANKET UPPER ADULT

## (undated) DEVICE — Device

## (undated) DEVICE — CABLE DAC ACTIVE CORD